# Patient Record
Sex: FEMALE | Race: WHITE | NOT HISPANIC OR LATINO | URBAN - METROPOLITAN AREA
[De-identification: names, ages, dates, MRNs, and addresses within clinical notes are randomized per-mention and may not be internally consistent; named-entity substitution may affect disease eponyms.]

---

## 2017-07-03 ENCOUNTER — OUTPATIENT (OUTPATIENT)
Dept: OUTPATIENT SERVICES | Facility: HOSPITAL | Age: 67
LOS: 1 days | End: 2017-07-03

## 2017-07-11 ENCOUNTER — INPATIENT (INPATIENT)
Facility: HOSPITAL | Age: 67
LOS: 1 days | Discharge: HOME | End: 2017-07-13
Payer: COMMERCIAL

## 2017-07-11 ENCOUNTER — OUTPATIENT (OUTPATIENT)
Dept: OUTPATIENT SERVICES | Facility: HOSPITAL | Age: 67
LOS: 1 days | End: 2017-07-11

## 2017-07-11 PROCEDURE — 73501 X-RAY EXAM HIP UNI 1 VIEW: CPT | Mod: 26,RT

## 2018-05-21 ENCOUNTER — APPOINTMENT (OUTPATIENT)
Dept: DERMATOLOGY | Facility: CLINIC | Age: 68
End: 2018-05-21
Payer: COMMERCIAL

## 2018-05-21 VITALS — BODY MASS INDEX: 26.2 KG/M2 | WEIGHT: 163 LBS | HEIGHT: 66 IN

## 2018-05-21 DIAGNOSIS — Z87.2 PERSONAL HISTORY OF DISEASES OF THE SKIN AND SUBCUTANEOUS TISSUE: ICD-10-CM

## 2018-05-21 DIAGNOSIS — Z87.09 PERSONAL HISTORY OF OTHER DISEASES OF THE RESPIRATORY SYSTEM: ICD-10-CM

## 2018-05-21 DIAGNOSIS — Z80.8 FAMILY HISTORY OF MALIGNANT NEOPLASM OF OTHER ORGANS OR SYSTEMS: ICD-10-CM

## 2018-05-21 DIAGNOSIS — Z87.891 PERSONAL HISTORY OF NICOTINE DEPENDENCE: ICD-10-CM

## 2018-05-21 DIAGNOSIS — Z80.9 FAMILY HISTORY OF MALIGNANT NEOPLASM, UNSPECIFIED: ICD-10-CM

## 2018-05-21 DIAGNOSIS — Z86.69 PERSONAL HISTORY OF OTHER DISEASES OF THE NERVOUS SYSTEM AND SENSE ORGANS: ICD-10-CM

## 2018-05-21 DIAGNOSIS — Z87.39 PERSONAL HISTORY OF OTHER DISEASES OF THE MUSCULOSKELETAL SYSTEM AND CONNECTIVE TISSUE: ICD-10-CM

## 2018-05-21 PROCEDURE — 99203 OFFICE O/P NEW LOW 30 MIN: CPT

## 2021-02-16 ENCOUNTER — OUTPATIENT (OUTPATIENT)
Dept: OUTPATIENT SERVICES | Facility: HOSPITAL | Age: 71
LOS: 1 days | End: 2021-02-16
Payer: COMMERCIAL

## 2021-02-16 PROCEDURE — 93010 ELECTROCARDIOGRAM REPORT: CPT

## 2021-03-04 DIAGNOSIS — Z01.818 ENCOUNTER FOR OTHER PREPROCEDURAL EXAMINATION: ICD-10-CM

## 2021-03-05 ENCOUNTER — APPOINTMENT (OUTPATIENT)
Dept: DISASTER EMERGENCY | Facility: CLINIC | Age: 71
End: 2021-03-05

## 2021-03-06 LAB — SARS-COV-2 N GENE NPH QL NAA+PROBE: NOT DETECTED

## 2021-03-08 ENCOUNTER — OUTPATIENT (OUTPATIENT)
Dept: OUTPATIENT SERVICES | Facility: HOSPITAL | Age: 71
LOS: 1 days | End: 2021-03-08

## 2021-03-08 ENCOUNTER — OUTPATIENT (OUTPATIENT)
Dept: INPATIENT UNIT | Facility: HOSPITAL | Age: 71
LOS: 1 days | End: 2021-03-08

## 2021-03-10 ENCOUNTER — OUTPATIENT (OUTPATIENT)
Dept: OUTPATIENT SERVICES | Facility: HOSPITAL | Age: 71
LOS: 1 days | End: 2021-03-10

## 2021-06-14 ENCOUNTER — APPOINTMENT (OUTPATIENT)
Dept: DERMATOLOGY | Facility: CLINIC | Age: 71
End: 2021-06-14

## 2021-07-20 ENCOUNTER — APPOINTMENT (OUTPATIENT)
Dept: DERMATOLOGY | Facility: CLINIC | Age: 71
End: 2021-07-20
Payer: COMMERCIAL

## 2021-07-20 DIAGNOSIS — L21.9 SEBORRHEIC DERMATITIS, UNSPECIFIED: ICD-10-CM

## 2021-07-20 DIAGNOSIS — Z12.83 ENCOUNTER FOR SCREENING FOR MALIGNANT NEOPLASM OF SKIN: ICD-10-CM

## 2021-07-20 PROCEDURE — 99072 ADDL SUPL MATRL&STAF TM PHE: CPT

## 2021-07-20 PROCEDURE — 99204 OFFICE O/P NEW MOD 45 MIN: CPT

## 2021-07-20 RX ORDER — TRIAMCINOLONE ACETONIDE 1 MG/G
0.1 OINTMENT TOPICAL
Qty: 1 | Refills: 0 | Status: ACTIVE | COMMUNITY
Start: 2021-07-20 | End: 1900-01-01

## 2021-07-20 RX ORDER — DOXYCYCLINE HYCLATE 100 MG/1
100 TABLET ORAL
Qty: 2 | Refills: 0 | Status: COMPLETED | COMMUNITY
Start: 2021-07-07 | End: 2021-07-20

## 2021-07-20 RX ORDER — KETOCONAZOLE 20 MG/G
2 CREAM TOPICAL
Qty: 1 | Refills: 2 | Status: ACTIVE | COMMUNITY
Start: 2021-07-20 | End: 1900-01-01

## 2021-08-26 ENCOUNTER — RX RENEWAL (OUTPATIENT)
Age: 71
End: 2021-08-26

## 2021-08-26 DIAGNOSIS — F41.9 ANXIETY DISORDER, UNSPECIFIED: ICD-10-CM

## 2021-08-26 DIAGNOSIS — F32.9 ANXIETY DISORDER, UNSPECIFIED: ICD-10-CM

## 2021-10-08 ENCOUNTER — APPOINTMENT (OUTPATIENT)
Dept: FAMILY MEDICINE | Facility: CLINIC | Age: 71
End: 2021-10-08

## 2022-05-18 ENCOUNTER — RX RENEWAL (OUTPATIENT)
Age: 72
End: 2022-05-18

## 2022-05-30 ENCOUNTER — FORM ENCOUNTER (OUTPATIENT)
Age: 72
End: 2022-05-30

## 2022-07-11 ENCOUNTER — APPOINTMENT (OUTPATIENT)
Dept: DERMATOLOGY | Facility: CLINIC | Age: 72
End: 2022-07-11

## 2022-07-11 DIAGNOSIS — D22.9 MELANOCYTIC NEVI, UNSPECIFIED: ICD-10-CM

## 2022-07-11 DIAGNOSIS — L82.1 OTHER SEBORRHEIC KERATOSIS: ICD-10-CM

## 2022-07-11 DIAGNOSIS — D18.01 HEMANGIOMA OF SKIN AND SUBCUTANEOUS TISSUE: ICD-10-CM

## 2022-07-11 PROCEDURE — 99213 OFFICE O/P EST LOW 20 MIN: CPT

## 2022-07-11 NOTE — PHYSICAL EXAM
[Full Body Skin Exam Performed] : performed [FreeTextEntry3] : Skin examination performed of the face, neck, trunk, arms, legs; \par The patient is well, alert and oriented, pleasant and cooperative.\par Eyelids, conjunctivae, oral mucosa, digits and nails all normal.  \par No cervical adenopathy.\par \par Normal findings include:\par \par Seborrheic keratoses- multiple\par Angiomas- one papular angioma mid chest\par Lentigines\par Multiple benign nevi were noted. \par \par No lesions were suspicious for malignancy. \par \par

## 2022-07-11 NOTE — ASSESSMENT
[FreeTextEntry1] : Complete skin examination is negative for malignancy; Multiple new concerns were addressed and discussed.\par Therapeutic options and their risks and benefits; along with multiple diagnostic possibilities were discussed at length;\par risks and benefits of skin biopsy and/or other further study were discussed;\par \par Continue regular exams; Follow up for TBSE in 1 year \par new angioma mid chest;  No treatment is required unless this lesion becomes symptomatic.

## 2022-07-11 NOTE — HISTORY OF PRESENT ILLNESS
[de-identified] : Pt. presents for skin check;\par c/o few spots of concern;  one new spot on chest\par Severity:  mild  \par Modifying factors:  none\par Associated symptoms:  none\par Context:  no association with activity\par

## 2022-08-10 ENCOUNTER — FORM ENCOUNTER (OUTPATIENT)
Age: 72
End: 2022-08-10

## 2022-10-31 ENCOUNTER — RX RENEWAL (OUTPATIENT)
Age: 72
End: 2022-10-31
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
## 2022-12-17 DIAGNOSIS — E55.9 VITAMIN D DEFICIENCY, UNSPECIFIED: ICD-10-CM

## 2022-12-17 DIAGNOSIS — J45.909 UNSPECIFIED ASTHMA, UNCOMPLICATED: ICD-10-CM

## 2022-12-17 RX ORDER — FLUOXETINE HYDROCHLORIDE 20 MG/1
20 CAPSULE ORAL
Refills: 0 | Status: DISCONTINUED | COMMUNITY
End: 2022-12-17

## 2022-12-18 ENCOUNTER — RESULT CHARGE (OUTPATIENT)
Age: 72
End: 2022-12-18

## 2022-12-19 ENCOUNTER — NON-APPOINTMENT (OUTPATIENT)
Age: 72
End: 2022-12-19

## 2022-12-19 ENCOUNTER — APPOINTMENT (OUTPATIENT)
Dept: FAMILY MEDICINE | Facility: CLINIC | Age: 72
End: 2022-12-19

## 2022-12-19 VITALS
TEMPERATURE: 97 F | HEART RATE: 83 BPM | BODY MASS INDEX: 28.12 KG/M2 | HEIGHT: 66 IN | OXYGEN SATURATION: 98 % | SYSTOLIC BLOOD PRESSURE: 158 MMHG | WEIGHT: 175 LBS | DIASTOLIC BLOOD PRESSURE: 82 MMHG

## 2022-12-19 VITALS — SYSTOLIC BLOOD PRESSURE: 128 MMHG | DIASTOLIC BLOOD PRESSURE: 78 MMHG

## 2022-12-19 DIAGNOSIS — Z23 ENCOUNTER FOR IMMUNIZATION: ICD-10-CM

## 2022-12-19 DIAGNOSIS — Z00.00 ENCOUNTER FOR GENERAL ADULT MEDICAL EXAMINATION W/OUT ABNORMAL FINDINGS: ICD-10-CM

## 2022-12-19 PROCEDURE — 99387 INIT PM E/M NEW PAT 65+ YRS: CPT | Mod: 25

## 2022-12-19 PROCEDURE — 90471 IMMUNIZATION ADMIN: CPT

## 2022-12-19 PROCEDURE — 93000 ELECTROCARDIOGRAM COMPLETE: CPT

## 2022-12-19 PROCEDURE — 36415 COLL VENOUS BLD VENIPUNCTURE: CPT

## 2022-12-19 PROCEDURE — 90750 HZV VACC RECOMBINANT IM: CPT

## 2022-12-19 NOTE — ASSESSMENT
[FreeTextEntry1] : PUNEET GONZALEZ is a 72 year old female here for a physical exam.\par \par Patient has a history of asthma, allergies, anxiety, depression, and vitamin D deficiency.\par \par Her EKG is within normal limits. \par

## 2022-12-19 NOTE — HISTORY OF PRESENT ILLNESS
[FreeTextEntry1] : PUNEET GONZALEZ is a 72 year old female here for a physical exam.  [de-identified] : Her last physical exam was 12/2020\par \par Vaccines:\par Tetanus is up to date; last 12/22/2015\par Pneumococcal vaccination is up to date\par Shingrix is NOT up to date; she had Zostavax in 2015\par COVID vaccine is up to date \par \par Her last dentist visit was less than one year ago\par Her last eye doctor appointment was less than one year ago\par Her last dermatologist visit was less than one year ago\par \par GYN visit is up to date, Dr. Kaley Otero ([facility])\par Mammogram is up to date\par Colon cancer screening is up to date; colonoscopy 8/11/2022, 5 yr f/u recommended (Dr. Lazar)\par DEXA is up to date \par \par Her diet is healthy overall\par Exercise: cardio, walking, swimming

## 2022-12-19 NOTE — HEALTH RISK ASSESSMENT
[No falls in past year] : Patient reported no falls in the past year [0] : 2) Feeling down, depressed, or hopeless: Not at all (0) [PHQ-2 Negative - No further assessment needed] : PHQ-2 Negative - No further assessment needed [[id number]] : 0

## 2022-12-19 NOTE — PLAN
[FreeTextEntry1] : Continue all medications as prescribed. Check labs as above. Will adjust any medications based upon lab results.\par \par Reviewed age-appropriate preventive screening tests with patient.\par \par Discussed clean eating (e.g. Mediterranean style diet) and recommendations for regular exercise/staying as physically active as possible.\par \par Reviewed importance of good self care (e.g. meditation, yoga, adequate rest, regular exercise, magnesium, clean eating, etc.).\par \par Follow up for next physical in one year.

## 2022-12-20 LAB
25(OH)D3 SERPL-MCNC: 18.9 NG/ML
ALBUMIN SERPL ELPH-MCNC: 4.4 G/DL
ALP BLD-CCNC: 93 U/L
ALT SERPL-CCNC: 12 U/L
ANION GAP SERPL CALC-SCNC: 9 MMOL/L
AST SERPL-CCNC: 18 U/L
BASOPHILS # BLD AUTO: 0.04 K/UL
BASOPHILS NFR BLD AUTO: 0.7 %
BILIRUB SERPL-MCNC: 0.3 MG/DL
BUN SERPL-MCNC: 15 MG/DL
CALCIUM SERPL-MCNC: 9.4 MG/DL
CHLORIDE SERPL-SCNC: 104 MMOL/L
CHOLEST SERPL-MCNC: 228 MG/DL
CO2 SERPL-SCNC: 28 MMOL/L
CREAT SERPL-MCNC: 0.63 MG/DL
EGFR: 95 ML/MIN/1.73M2
EOSINOPHIL # BLD AUTO: 0.09 K/UL
EOSINOPHIL NFR BLD AUTO: 1.6 %
GLUCOSE SERPL-MCNC: 92 MG/DL
HCT VFR BLD CALC: 42.8 %
HDLC SERPL-MCNC: 71 MG/DL
HGB BLD-MCNC: 13.6 G/DL
IMM GRANULOCYTES NFR BLD AUTO: 0.4 %
LDLC SERPL CALC-MCNC: 143 MG/DL
LYMPHOCYTES # BLD AUTO: 2.13 K/UL
LYMPHOCYTES NFR BLD AUTO: 38.5 %
MAN DIFF?: NORMAL
MCHC RBC-ENTMCNC: 29.4 PG
MCHC RBC-ENTMCNC: 31.8 GM/DL
MCV RBC AUTO: 92.6 FL
MONOCYTES # BLD AUTO: 0.44 K/UL
MONOCYTES NFR BLD AUTO: 8 %
NEUTROPHILS # BLD AUTO: 2.81 K/UL
NEUTROPHILS NFR BLD AUTO: 50.8 %
NONHDLC SERPL-MCNC: 157 MG/DL
PLATELET # BLD AUTO: 134 K/UL
POTASSIUM SERPL-SCNC: 4.2 MMOL/L
PROT SERPL-MCNC: 6.7 G/DL
RBC # BLD: 4.62 M/UL
RBC # FLD: 13.2 %
SODIUM SERPL-SCNC: 142 MMOL/L
TRIGL SERPL-MCNC: 70 MG/DL
TSH SERPL-ACNC: 1.33 UIU/ML
WBC # FLD AUTO: 5.53 K/UL

## 2022-12-21 ENCOUNTER — NON-APPOINTMENT (OUTPATIENT)
Age: 72
End: 2022-12-21

## 2023-01-06 RX ORDER — ALBUTEROL SULFATE 90 UG/1
108 (90 BASE) INHALANT RESPIRATORY (INHALATION)
Qty: 1 | Refills: 3 | Status: ACTIVE | COMMUNITY
Start: 2023-01-06 | End: 1900-01-01

## 2023-03-06 ENCOUNTER — APPOINTMENT (OUTPATIENT)
Dept: FAMILY MEDICINE | Facility: CLINIC | Age: 73
End: 2023-03-06

## 2023-05-01 ENCOUNTER — RX RENEWAL (OUTPATIENT)
Age: 73
End: 2023-05-01

## 2023-09-11 ENCOUNTER — RX RENEWAL (OUTPATIENT)
Age: 73
End: 2023-09-11

## 2023-09-11 RX ORDER — MONTELUKAST 10 MG/1
10 TABLET, FILM COATED ORAL
Qty: 90 | Refills: 3 | Status: ACTIVE | COMMUNITY
Start: 1900-01-01 | End: 1900-01-01

## 2023-09-15 ASSESSMENT — KOOS JR
IMPORTED KOOS JR SCORE: 50.01
BENDING TO THE FLOOR TO PICK UP OBJECT: MODERATE
BENDING TO THE FLOOR TO PICK UP OBJECT: MODERATE
TWISING OR PIVOTING ON KNEE: MILD
RISING FROM SITTING: MODERATE
IMPORTED KOOS JR SCORE: 50.01
IMPORTED KOOS JR SCORE: 50.01
HOW SEVERE IS YOUR KNEE STIFFNESS AFTER FIRST WAKING IN MORNING: MODERATE
TWISING OR PIVOTING ON KNEE: SEVERE
HOW SEVERE IS YOUR KNEE STIFFNESS AFTER FIRST WAKING IN MORNING: MODERATE
RISING FROM SITTING: MILD
IMPORTED KOOS JR SCORE: 84.6
IMPORTED FORM: YES
GOING UP OR DOWN STAIRS: MODERATE
GOING UP OR DOWN STAIRS: MODERATE
IMPORTED KOOS JR SCORE: 84.6
TWISING OR PIVOTING ON KNEE: MILD
IMPORTED FORM: YES
IMPORTED LATERALITY: RIGHT
IMPORTED FORM: YES
BENDING TO THE FLOOR TO PICK UP OBJECT: MODERATE
KOOS JR RAW SCORE: 15
TWISING OR PIVOTING ON KNEE: SEVERE
STANDING UPRIGHT: MODERATE
TWISING OR PIVOTING ON KNEE: MILD
KOOS JR RAW SCORE: 4
KOOS JR RAW SCORE: 5
STRAIGHTENING KNEE FULLY: MODERATE
BENDING TO THE FLOOR TO PICK UP OBJECT: MILD
TWISING OR PIVOTING ON KNEE: MILD
HOW SEVERE IS YOUR KNEE STIFFNESS AFTER FIRST WAKING IN MORNING: MILD
KOOS JR RAW SCORE: 15
STANDING UPRIGHT: MODERATE
BENDING TO THE FLOOR TO PICK UP OBJECT: MILD
RISING FROM SITTING: MILD
GOING UP OR DOWN STAIRS: MILD
IMPORTED KOOS JR SCORE: 76.33
KOOS JR RAW SCORE: 2
GOING UP OR DOWN STAIRS: MILD
IMPORTED FORM: YES
IMPORTED LATERALITY: RIGHT
IMPORTED KOOS JR SCORE: 84.6
IMPORTED KOOS JR SCORE: 76.33
KOOS JR RAW SCORE: 4
TWISING OR PIVOTING ON KNEE: MILD
IMPORTED LATERALITY: RIGHT
HOW SEVERE IS YOUR KNEE STIFFNESS AFTER FIRST WAKING IN MORNING: MILD
RISING FROM SITTING: MODERATE
KOOS JR RAW SCORE: 5
RISING FROM SITTING: MILD
GOING UP OR DOWN STAIRS: MILD
TWISING OR PIVOTING ON KNEE: SEVERE
GOING UP OR DOWN STAIRS: MILD
HOW SEVERE IS YOUR KNEE STIFFNESS AFTER FIRST WAKING IN MORNING: MODERATE
BENDING TO THE FLOOR TO PICK UP OBJECT: MILD
RISING FROM SITTING: MILD
STANDING UPRIGHT: MODERATE
RISING FROM SITTING: MILD
KOOS JR RAW SCORE: 4
IMPORTED LATERALITY: RIGHT
IMPORTED FORM: YES
GOING UP OR DOWN STAIRS: MILD
KOOS JR RAW SCORE: 2
HOW SEVERE IS YOUR KNEE STIFFNESS AFTER FIRST WAKING IN MORNING: MILD
GOING UP OR DOWN STAIRS: MILD
RISING FROM SITTING: MILD
TWISING OR PIVOTING ON KNEE: MILD
STRAIGHTENING KNEE FULLY: MODERATE
RISING FROM SITTING: MODERATE
IMPORTED KOOS JR SCORE: 76.33
IMPORTED KOOS JR SCORE: 73.34
IMPORTED LATERALITY: RIGHT
KOOS JR RAW SCORE: 5
IMPORTED FORM: YES
HOW SEVERE IS YOUR KNEE STIFFNESS AFTER FIRST WAKING IN MORNING: MILD
IMPORTED KOOS JR SCORE: 73.34
KOOS JR RAW SCORE: 15
HOW SEVERE IS YOUR KNEE STIFFNESS AFTER FIRST WAKING IN MORNING: MILD
IMPORTED KOOS JR SCORE: 73.34
HOW SEVERE IS YOUR KNEE STIFFNESS AFTER FIRST WAKING IN MORNING: MILD
IMPORTED LATERALITY: RIGHT
STRAIGHTENING KNEE FULLY: MODERATE
KOOS JR RAW SCORE: 2
GOING UP OR DOWN STAIRS: MODERATE

## 2023-11-07 ENCOUNTER — APPOINTMENT (OUTPATIENT)
Dept: FAMILY MEDICINE | Facility: CLINIC | Age: 73
End: 2023-11-07
Payer: COMMERCIAL

## 2023-11-07 VITALS
TEMPERATURE: 97.1 F | DIASTOLIC BLOOD PRESSURE: 82 MMHG | BODY MASS INDEX: 28.45 KG/M2 | SYSTOLIC BLOOD PRESSURE: 104 MMHG | HEART RATE: 96 BPM | WEIGHT: 177 LBS | HEIGHT: 66 IN | OXYGEN SATURATION: 95 %

## 2023-11-07 DIAGNOSIS — R59.1 GENERALIZED ENLARGED LYMPH NODES: ICD-10-CM

## 2023-11-07 PROCEDURE — 90471 IMMUNIZATION ADMIN: CPT

## 2023-11-07 PROCEDURE — 90750 HZV VACC RECOMBINANT IM: CPT

## 2023-11-07 PROCEDURE — 99213 OFFICE O/P EST LOW 20 MIN: CPT | Mod: 25

## 2023-11-21 ENCOUNTER — OUTPATIENT (OUTPATIENT)
Dept: OUTPATIENT SERVICES | Facility: HOSPITAL | Age: 73
LOS: 1 days | End: 2023-11-21
Payer: COMMERCIAL

## 2023-11-21 ENCOUNTER — APPOINTMENT (OUTPATIENT)
Dept: ULTRASOUND IMAGING | Facility: CLINIC | Age: 73
End: 2023-11-21
Payer: COMMERCIAL

## 2023-11-21 DIAGNOSIS — R59.1 GENERALIZED ENLARGED LYMPH NODES: ICD-10-CM

## 2023-11-21 PROCEDURE — 76536 US EXAM OF HEAD AND NECK: CPT

## 2023-11-21 PROCEDURE — 76536 US EXAM OF HEAD AND NECK: CPT | Mod: 26

## 2023-11-30 DIAGNOSIS — J30.2 OTHER SEASONAL ALLERGIC RHINITIS: ICD-10-CM

## 2023-12-18 ENCOUNTER — NON-APPOINTMENT (OUTPATIENT)
Age: 73
End: 2023-12-18

## 2023-12-18 ENCOUNTER — APPOINTMENT (OUTPATIENT)
Dept: ALLERGY | Facility: CLINIC | Age: 73
End: 2023-12-18
Payer: COMMERCIAL

## 2023-12-18 VITALS
DIASTOLIC BLOOD PRESSURE: 72 MMHG | HEART RATE: 83 BPM | TEMPERATURE: 98.8 F | RESPIRATION RATE: 13 BRPM | SYSTOLIC BLOOD PRESSURE: 125 MMHG | OXYGEN SATURATION: 96 %

## 2023-12-18 PROCEDURE — 95004 PERQ TESTS W/ALRGNC XTRCS: CPT

## 2023-12-18 PROCEDURE — 94060 EVALUATION OF WHEEZING: CPT

## 2023-12-18 PROCEDURE — 99204 OFFICE O/P NEW MOD 45 MIN: CPT | Mod: 25

## 2023-12-18 RX ORDER — LEVALBUTEROL TARTRATE 45 UG/1
45 AEROSOL, METERED ORAL EVERY 4 HOURS
Qty: 1 | Refills: 1 | Status: ACTIVE | COMMUNITY
Start: 2023-12-18 | End: 1900-01-01

## 2023-12-18 NOTE — SOCIAL HISTORY
[House] : [unfilled] lives in a house  [Central Forced Air] : heating provided by central forced air [Central] : air conditioning provided by central unit [Bedroom] :  in bedroom [Dog] : dog [] :  [FreeTextEntry1] : Lives with spouse Navdeep gupta.   [Basement] : not in the basement [Living Area] : not in the living area [de-identified] : area lalos

## 2023-12-18 NOTE — IMPRESSION
[_____] : dust mites ([unfilled]) [Allergy Testing Cockroach] : cockroach [Allergy Testing Cat] : cat [Allergy Testing Dog] : dog [] : molds [Allergy Testing Trees] : trees [Allergy Testing Weeds] : weeds [Allergy Testing Grasses] : grasses

## 2023-12-18 NOTE — PHYSICAL EXAM
[Alert] : alert [Well Nourished] : well nourished [Healthy Appearance] : healthy appearance [No Acute Distress] : no acute distress [Well Developed] : well developed [Normal Voice/Communication] : normal voice communication [Normal Nasal Mucosa] : the nasal mucosa was normal [No Crackles] : no crackles [No Retractions] : no retractions [Normal Rate] : heart rate was normal  [Normal S1, S2] : normal S1 and S2 [Regular Rhythm] : with a regular rhythm [Skin Intact] : skin intact  [Normal Mood] : mood was normal [Normal Affect] : affect was normal [Wheezing] : no wheezing was heard [de-identified] : one small movable LN on right posterior cervical  [de-identified] : see above

## 2023-12-18 NOTE — ASSESSMENT
[FreeTextEntry1] : Mild persistent asthma:  Continue Singulair QD Add Arnuity 200 QD Change to Xopenex 2 puffs QID prn   Perennial allergic rhinitis:  RV environmental intradermal skin testing   Right posterior cervical LN - mild enlarged LN - recommend follow up with PCP in 1 month

## 2023-12-18 NOTE — HISTORY OF PRESENT ILLNESS
[de-identified] : Patient with a long history of seasonal allergies which became perennial - she was followed by Dr. Fernando and she stopped treatment over 15 years ago.   Her PCP was treating her symptoms with Singulair - albuterol prn - Flonase - for control of asthma and SHO.   She noted an enlarged LN right side of neck - US performed - no biopsy was done.    Patient had COVID 5/23 - she was sick for about 5 days - she took Paxlovid.    She was supposed to take Claritin D and she uses prn azelastine.    Patient has had asthma since her 50s - symptoms have worsened as she gets older - she does not use her rescue inhaler - she gets very "jumpy" with albuterol.

## 2024-01-15 ENCOUNTER — APPOINTMENT (OUTPATIENT)
Dept: ALLERGY | Facility: CLINIC | Age: 74
End: 2024-01-15
Payer: COMMERCIAL

## 2024-01-15 PROCEDURE — 95024 IQ TESTS W/ALLERGENIC XTRCS: CPT

## 2024-01-15 PROCEDURE — 99214 OFFICE O/P EST MOD 30 MIN: CPT | Mod: 25

## 2024-01-15 NOTE — SOCIAL HISTORY
[House] : [unfilled] lives in a house  [Central Forced Air] : heating provided by central forced air [Central] : air conditioning provided by central unit [Bedroom] :  in bedroom [Dog] : dog [] :  [FreeTextEntry1] : Lives with spouse Navdeep gupta.   [Basement] : not in the basement [Living Area] : not in the living area [de-identified] : area lalos

## 2024-01-15 NOTE — ASSESSMENT
[FreeTextEntry1] : Mild persistent asthma:  Continue Singulair QD Add Arnuity 200 QD Change to Xopenex 2 puffs QID prn   Perennial allergic rhinitis:  Avoidance measures reviewed with patient.   RV 6 months or prn

## 2024-01-15 NOTE — HISTORY OF PRESENT ILLNESS
[de-identified] : Patient is better on Arnuity 200 QD and she tolerates Xopenex - asthma better tolerated and tolerates Xopenex better

## 2024-01-15 NOTE — PHYSICAL EXAM
[Alert] : alert [Well Nourished] : well nourished [Healthy Appearance] : healthy appearance [No Acute Distress] : no acute distress [Well Developed] : well developed [Normal Voice/Communication] : normal voice communication [Normal Nasal Mucosa] : the nasal mucosa was normal [No Crackles] : no crackles [No Retractions] : no retractions [Normal Rate] : heart rate was normal  [Normal S1, S2] : normal S1 and S2 [Regular Rhythm] : with a regular rhythm [Skin Intact] : skin intact  [Normal Mood] : mood was normal [Normal Affect] : affect was normal [Wheezing] : no wheezing was heard [de-identified] : one small movable LN on right posterior cervical  [de-identified] : see above

## 2024-02-12 ENCOUNTER — RX RENEWAL (OUTPATIENT)
Age: 74
End: 2024-02-12

## 2024-02-12 RX ORDER — FLUOXETINE HYDROCHLORIDE 20 MG/1
20 CAPSULE ORAL
Qty: 90 | Refills: 2 | Status: ACTIVE | COMMUNITY
Start: 2021-08-26 | End: 1900-01-01

## 2024-05-15 ENCOUNTER — APPOINTMENT (OUTPATIENT)
Dept: ALLERGY | Facility: CLINIC | Age: 74
End: 2024-05-15
Payer: COMMERCIAL

## 2024-05-15 VITALS
OXYGEN SATURATION: 95 % | SYSTOLIC BLOOD PRESSURE: 132 MMHG | HEART RATE: 89 BPM | DIASTOLIC BLOOD PRESSURE: 72 MMHG | TEMPERATURE: 98.4 F

## 2024-05-15 PROCEDURE — 99214 OFFICE O/P EST MOD 30 MIN: CPT | Mod: 25

## 2024-05-15 PROCEDURE — 95012 NITRIC OXIDE EXP GAS DETER: CPT

## 2024-05-15 NOTE — HISTORY OF PRESENT ILLNESS
[de-identified] : Patient is taking Arnuity QD - Singulair QD and Xopenex - she reports use of rescue inhaler rarely - she had URI about 1 month ago - needed Xopenex on a regular basis -

## 2024-05-15 NOTE — PHYSICAL EXAM
[Alert] : alert [Well Nourished] : well nourished [Healthy Appearance] : healthy appearance [No Acute Distress] : no acute distress [Well Developed] : well developed [Normal Voice/Communication] : normal voice communication [Normal Nasal Mucosa] : the nasal mucosa was normal [No Crackles] : no crackles [No Retractions] : no retractions [Wheezing] : no wheezing was heard [Normal Rate] : heart rate was normal  [Normal S1, S2] : normal S1 and S2 [Regular Rhythm] : with a regular rhythm [Skin Intact] : skin intact  [Normal Mood] : mood was normal [Normal Affect] : affect was normal [de-identified] : one small movable LN on right posterior cervical  [de-identified] : see above

## 2024-05-15 NOTE — SOCIAL HISTORY
[FreeTextEntry1] : Lives with spouse Navdeep gupta.   [House] : [unfilled] lives in a house  [Central Forced Air] : heating provided by central forced air [Central] : air conditioning provided by central unit [Bedroom] :  in bedroom [Basement] : not in the basement [Living Area] : not in the living area [Dog] : dog [] :  [de-identified] : area lalos

## 2024-05-15 NOTE — ASSESSMENT
[FreeTextEntry1] : Mild persistent asthma:  Singulair QD Arnuity 200 QD Change to Xopenex 2 puffs QID prn   RV 6 months or prn

## 2024-05-27 ENCOUNTER — RX RENEWAL (OUTPATIENT)
Age: 74
End: 2024-05-27

## 2024-05-27 RX ORDER — FLUTICASONE FUROATE 200 UG/1
200 POWDER RESPIRATORY (INHALATION) DAILY
Qty: 30 | Refills: 2 | Status: ACTIVE | COMMUNITY
Start: 2023-12-18 | End: 1900-01-01

## 2024-10-08 ENCOUNTER — RX RENEWAL (OUTPATIENT)
Age: 74
End: 2024-10-08

## 2024-11-13 ENCOUNTER — APPOINTMENT (OUTPATIENT)
Dept: ALLERGY | Facility: CLINIC | Age: 74
End: 2024-11-13
Payer: COMMERCIAL

## 2024-11-13 VITALS
HEIGHT: 66 IN | DIASTOLIC BLOOD PRESSURE: 80 MMHG | OXYGEN SATURATION: 98 % | HEART RATE: 65 BPM | TEMPERATURE: 98.7 F | BODY MASS INDEX: 28.45 KG/M2 | SYSTOLIC BLOOD PRESSURE: 146 MMHG | WEIGHT: 177 LBS

## 2024-11-13 PROCEDURE — 99214 OFFICE O/P EST MOD 30 MIN: CPT | Mod: 25

## 2024-11-13 PROCEDURE — 94060 EVALUATION OF WHEEZING: CPT

## 2024-11-13 PROCEDURE — 95012 NITRIC OXIDE EXP GAS DETER: CPT

## 2024-12-18 ENCOUNTER — RX RENEWAL (OUTPATIENT)
Age: 74
End: 2024-12-18

## 2024-12-18 ENCOUNTER — APPOINTMENT (OUTPATIENT)
Dept: BREAST CENTER | Facility: CLINIC | Age: 74
End: 2024-12-18
Payer: COMMERCIAL

## 2024-12-18 VITALS — HEIGHT: 66.5 IN | BODY MASS INDEX: 28.75 KG/M2 | RESPIRATION RATE: 16 BRPM | WEIGHT: 181 LBS

## 2024-12-18 DIAGNOSIS — R59.0 LOCALIZED ENLARGED LYMPH NODES: ICD-10-CM

## 2024-12-18 PROCEDURE — 99205 OFFICE O/P NEW HI 60 MIN: CPT | Mod: 25

## 2024-12-18 PROCEDURE — 38505 NEEDLE BIOPSY LYMPH NODES: CPT | Mod: RT

## 2024-12-19 PROBLEM — R59.0 AXILLARY LYMPHADENOPATHY: Status: ACTIVE | Noted: 2024-12-13

## 2024-12-24 LAB — CORE LAB BIOPSY: NORMAL

## 2024-12-26 ENCOUNTER — NON-APPOINTMENT (OUTPATIENT)
Age: 74
End: 2024-12-26

## 2025-01-07 ENCOUNTER — APPOINTMENT (OUTPATIENT)
Dept: NUCLEAR MEDICINE | Facility: CLINIC | Age: 75
End: 2025-01-07
Payer: COMMERCIAL

## 2025-01-07 ENCOUNTER — OUTPATIENT (OUTPATIENT)
Dept: OUTPATIENT SERVICES | Facility: HOSPITAL | Age: 75
LOS: 1 days | End: 2025-01-07
Payer: COMMERCIAL

## 2025-01-07 DIAGNOSIS — R59.0 LOCALIZED ENLARGED LYMPH NODES: ICD-10-CM

## 2025-01-07 PROCEDURE — 78815 PET IMAGE W/CT SKULL-THIGH: CPT | Mod: 26,PI

## 2025-01-07 PROCEDURE — A9552: CPT

## 2025-01-07 PROCEDURE — 78815 PET IMAGE W/CT SKULL-THIGH: CPT

## 2025-01-15 ENCOUNTER — OUTPATIENT (OUTPATIENT)
Dept: OUTPATIENT SERVICES | Facility: HOSPITAL | Age: 75
LOS: 1 days | Discharge: HOME | End: 2025-01-15
Payer: COMMERCIAL

## 2025-01-15 DIAGNOSIS — C85.90 NON-HODGKIN LYMPHOMA, UNSPECIFIED, UNSPECIFIED SITE: ICD-10-CM

## 2025-01-15 DIAGNOSIS — C82.90 FOLLICULAR LYMPHOMA, UNSPECIFIED, UNSPECIFIED SITE: ICD-10-CM

## 2025-01-16 ENCOUNTER — LABORATORY RESULT (OUTPATIENT)
Age: 75
End: 2025-01-16

## 2025-01-16 ENCOUNTER — RESULT REVIEW (OUTPATIENT)
Age: 75
End: 2025-01-16

## 2025-01-16 ENCOUNTER — APPOINTMENT (OUTPATIENT)
Dept: HEMATOLOGY ONCOLOGY | Facility: CLINIC | Age: 75
End: 2025-01-16

## 2025-01-16 VITALS
HEIGHT: 66.5 IN | WEIGHT: 179 LBS | SYSTOLIC BLOOD PRESSURE: 143 MMHG | OXYGEN SATURATION: 98 % | BODY MASS INDEX: 28.43 KG/M2 | TEMPERATURE: 98.7 F | DIASTOLIC BLOOD PRESSURE: 83 MMHG | HEART RATE: 82 BPM

## 2025-01-16 DIAGNOSIS — R53.83 OTHER FATIGUE: ICD-10-CM

## 2025-01-16 LAB
BASOPHILS # BLD AUTO: 0.04 K/UL — SIGNIFICANT CHANGE UP (ref 0–0.2)
BASOPHILS NFR BLD AUTO: 0.6 % — SIGNIFICANT CHANGE UP (ref 0–2)
EOSINOPHIL # BLD AUTO: 0.07 K/UL — SIGNIFICANT CHANGE UP (ref 0–0.5)
EOSINOPHIL NFR BLD AUTO: 1.1 % — SIGNIFICANT CHANGE UP (ref 0–6)
HCT VFR BLD CALC: 42.9 % — SIGNIFICANT CHANGE UP (ref 34.5–45)
HGB BLD-MCNC: 14.6 G/DL — SIGNIFICANT CHANGE UP (ref 11.5–15.5)
IMM GRANULOCYTES NFR BLD AUTO: 0.2 % — SIGNIFICANT CHANGE UP (ref 0–0.9)
LYMPHOCYTES # BLD AUTO: 1.57 K/UL — SIGNIFICANT CHANGE UP (ref 1–3.3)
LYMPHOCYTES # BLD AUTO: 24.2 % — SIGNIFICANT CHANGE UP (ref 13–44)
MCHC RBC-ENTMCNC: 30.7 PG — SIGNIFICANT CHANGE UP (ref 27–34)
MCHC RBC-ENTMCNC: 34 G/DL — SIGNIFICANT CHANGE UP (ref 32–36)
MCV RBC AUTO: 90.1 FL — SIGNIFICANT CHANGE UP (ref 80–100)
MONOCYTES # BLD AUTO: 0.48 K/UL — SIGNIFICANT CHANGE UP (ref 0–0.9)
MONOCYTES NFR BLD AUTO: 7.4 % — SIGNIFICANT CHANGE UP (ref 2–14)
NEUTROPHILS # BLD AUTO: 4.33 K/UL — SIGNIFICANT CHANGE UP (ref 1.8–7.4)
NEUTROPHILS NFR BLD AUTO: 66.5 % — SIGNIFICANT CHANGE UP (ref 43–77)
NRBC # BLD: 0 /100 WBCS — SIGNIFICANT CHANGE UP (ref 0–0)
NRBC BLD-RTO: 0 /100 WBCS — SIGNIFICANT CHANGE UP (ref 0–0)
PLATELET # BLD AUTO: 228 K/UL — SIGNIFICANT CHANGE UP (ref 150–400)
RBC # BLD: 4.76 M/UL — SIGNIFICANT CHANGE UP (ref 3.8–5.2)
RBC # FLD: 12.6 % — SIGNIFICANT CHANGE UP (ref 10.3–14.5)
WBC # BLD: 6.5 K/UL — SIGNIFICANT CHANGE UP (ref 3.8–10.5)
WBC # FLD AUTO: 6.5 K/UL — SIGNIFICANT CHANGE UP (ref 3.8–10.5)

## 2025-01-16 PROCEDURE — 93010 ELECTROCARDIOGRAM REPORT: CPT

## 2025-01-16 PROCEDURE — 99205 OFFICE O/P NEW HI 60 MIN: CPT

## 2025-01-20 LAB
ALBUMIN SERPL ELPH-MCNC: 4.6 G/DL
ALP BLD-CCNC: 86 U/L
ALT SERPL-CCNC: 9 U/L
ANION GAP SERPL CALC-SCNC: 11 MMOL/L
AST SERPL-CCNC: 17 U/L
BILIRUB SERPL-MCNC: 0.4 MG/DL
BUN SERPL-MCNC: 17 MG/DL
CALCIUM SERPL-MCNC: 9.6 MG/DL
CHLORIDE SERPL-SCNC: 104 MMOL/L
CO2 SERPL-SCNC: 28 MMOL/L
CREAT SERPL-MCNC: 0.75 MG/DL
EGFR: 85 ML/MIN/1.73M2
GLUCOSE SERPL-MCNC: 112 MG/DL
HBV CORE IGG+IGM SER QL: NONREACTIVE
HBV SURFACE AB SER QL: NONREACTIVE
HBV SURFACE AG SER QL: NONREACTIVE
HIV1+2 AB SPEC QL IA.RAPID: NONREACTIVE
LDH SERPL-CCNC: 204 U/L
POTASSIUM SERPL-SCNC: 4.1 MMOL/L
PROT SERPL-MCNC: 6.7 G/DL
SODIUM SERPL-SCNC: 143 MMOL/L
URATE SERPL-MCNC: 3.6 MG/DL

## 2025-01-28 ENCOUNTER — NON-APPOINTMENT (OUTPATIENT)
Age: 75
End: 2025-01-28

## 2025-01-30 ENCOUNTER — RESULT REVIEW (OUTPATIENT)
Age: 75
End: 2025-01-30

## 2025-01-30 ENCOUNTER — APPOINTMENT (OUTPATIENT)
Dept: INFUSION THERAPY | Facility: CLINIC | Age: 75
End: 2025-01-30

## 2025-01-30 LAB
BASOPHILS # BLD AUTO: 0.03 K/UL — SIGNIFICANT CHANGE UP (ref 0–0.2)
BASOPHILS NFR BLD AUTO: 0.6 % — SIGNIFICANT CHANGE UP (ref 0–2)
EOSINOPHIL # BLD AUTO: 0.07 K/UL — SIGNIFICANT CHANGE UP (ref 0–0.5)
EOSINOPHIL NFR BLD AUTO: 1.3 % — SIGNIFICANT CHANGE UP (ref 0–6)
HCT VFR BLD CALC: 43.1 % — SIGNIFICANT CHANGE UP (ref 34.5–45)
HGB BLD-MCNC: 14.5 G/DL — SIGNIFICANT CHANGE UP (ref 11.5–15.5)
IMM GRANULOCYTES NFR BLD AUTO: 0.2 % — SIGNIFICANT CHANGE UP (ref 0–0.9)
LYMPHOCYTES # BLD AUTO: 1.45 K/UL — SIGNIFICANT CHANGE UP (ref 1–3.3)
LYMPHOCYTES # BLD AUTO: 27.3 % — SIGNIFICANT CHANGE UP (ref 13–44)
MCHC RBC-ENTMCNC: 30.2 PG — SIGNIFICANT CHANGE UP (ref 27–34)
MCHC RBC-ENTMCNC: 33.6 G/DL — SIGNIFICANT CHANGE UP (ref 32–36)
MCV RBC AUTO: 89.8 FL — SIGNIFICANT CHANGE UP (ref 80–100)
MONOCYTES # BLD AUTO: 0.42 K/UL — SIGNIFICANT CHANGE UP (ref 0–0.9)
MONOCYTES NFR BLD AUTO: 7.9 % — SIGNIFICANT CHANGE UP (ref 2–14)
NEUTROPHILS # BLD AUTO: 3.34 K/UL — SIGNIFICANT CHANGE UP (ref 1.8–7.4)
NEUTROPHILS NFR BLD AUTO: 62.7 % — SIGNIFICANT CHANGE UP (ref 43–77)
NRBC # BLD: 0 /100 WBCS — SIGNIFICANT CHANGE UP (ref 0–0)
NRBC BLD-RTO: 0 /100 WBCS — SIGNIFICANT CHANGE UP (ref 0–0)
PLATELET # BLD AUTO: 138 K/UL — LOW (ref 150–400)
RBC # BLD: 4.8 M/UL — SIGNIFICANT CHANGE UP (ref 3.8–5.2)
RBC # FLD: 12.7 % — SIGNIFICANT CHANGE UP (ref 10.3–14.5)
WBC # BLD: 5.32 K/UL — SIGNIFICANT CHANGE UP (ref 3.8–10.5)
WBC # FLD AUTO: 5.32 K/UL — SIGNIFICANT CHANGE UP (ref 3.8–10.5)

## 2025-01-31 DIAGNOSIS — Z51.11 ENCOUNTER FOR ANTINEOPLASTIC CHEMOTHERAPY: ICD-10-CM

## 2025-01-31 DIAGNOSIS — R11.2 NAUSEA WITH VOMITING, UNSPECIFIED: ICD-10-CM

## 2025-02-06 ENCOUNTER — APPOINTMENT (OUTPATIENT)
Dept: INFUSION THERAPY | Facility: CLINIC | Age: 75
End: 2025-02-06

## 2025-02-06 ENCOUNTER — RESULT REVIEW (OUTPATIENT)
Age: 75
End: 2025-02-06

## 2025-02-06 VITALS
BODY MASS INDEX: 28.59 KG/M2 | WEIGHT: 180 LBS | HEART RATE: 82 BPM | DIASTOLIC BLOOD PRESSURE: 70 MMHG | HEIGHT: 66.5 IN | OXYGEN SATURATION: 96 % | TEMPERATURE: 98 F | SYSTOLIC BLOOD PRESSURE: 117 MMHG

## 2025-02-06 LAB
BASOPHILS # BLD AUTO: 0.03 K/UL — SIGNIFICANT CHANGE UP (ref 0–0.2)
BASOPHILS NFR BLD AUTO: 0.6 % — SIGNIFICANT CHANGE UP (ref 0–2)
EOSINOPHIL # BLD AUTO: 0.07 K/UL — SIGNIFICANT CHANGE UP (ref 0–0.5)
EOSINOPHIL NFR BLD AUTO: 1.4 % — SIGNIFICANT CHANGE UP (ref 0–6)
HCT VFR BLD CALC: 40.9 % — SIGNIFICANT CHANGE UP (ref 34.5–45)
HGB BLD-MCNC: 14.1 G/DL — SIGNIFICANT CHANGE UP (ref 11.5–15.5)
IMM GRANULOCYTES NFR BLD AUTO: 0.2 % — SIGNIFICANT CHANGE UP (ref 0–0.9)
LYMPHOCYTES # BLD AUTO: 0.98 K/UL — LOW (ref 1–3.3)
LYMPHOCYTES # BLD AUTO: 19.8 % — SIGNIFICANT CHANGE UP (ref 13–44)
MCHC RBC-ENTMCNC: 30.5 PG — SIGNIFICANT CHANGE UP (ref 27–34)
MCHC RBC-ENTMCNC: 34.5 G/DL — SIGNIFICANT CHANGE UP (ref 32–36)
MCV RBC AUTO: 88.5 FL — SIGNIFICANT CHANGE UP (ref 80–100)
MONOCYTES # BLD AUTO: 0.5 K/UL — SIGNIFICANT CHANGE UP (ref 0–0.9)
MONOCYTES NFR BLD AUTO: 10.1 % — SIGNIFICANT CHANGE UP (ref 2–14)
NEUTROPHILS # BLD AUTO: 3.35 K/UL — SIGNIFICANT CHANGE UP (ref 1.8–7.4)
NEUTROPHILS NFR BLD AUTO: 67.9 % — SIGNIFICANT CHANGE UP (ref 43–77)
NRBC # BLD: 0 /100 WBCS — SIGNIFICANT CHANGE UP (ref 0–0)
NRBC BLD-RTO: 0 /100 WBCS — SIGNIFICANT CHANGE UP (ref 0–0)
PLATELET # BLD AUTO: 150 K/UL — SIGNIFICANT CHANGE UP (ref 150–400)
RBC # BLD: 4.62 M/UL — SIGNIFICANT CHANGE UP (ref 3.8–5.2)
RBC # FLD: 12.8 % — SIGNIFICANT CHANGE UP (ref 10.3–14.5)
WBC # BLD: 4.94 K/UL — SIGNIFICANT CHANGE UP (ref 3.8–10.5)
WBC # FLD AUTO: 4.94 K/UL — SIGNIFICANT CHANGE UP (ref 3.8–10.5)

## 2025-02-12 DIAGNOSIS — C82.90 FOLLICULAR LYMPHOMA, UNSPECIFIED, UNSPECIFIED SITE: ICD-10-CM

## 2025-02-13 ENCOUNTER — RESULT REVIEW (OUTPATIENT)
Age: 75
End: 2025-02-13

## 2025-02-13 ENCOUNTER — APPOINTMENT (OUTPATIENT)
Dept: INFUSION THERAPY | Facility: CLINIC | Age: 75
End: 2025-02-13

## 2025-02-13 VITALS
OXYGEN SATURATION: 96 % | BODY MASS INDEX: 28.59 KG/M2 | DIASTOLIC BLOOD PRESSURE: 75 MMHG | SYSTOLIC BLOOD PRESSURE: 129 MMHG | HEART RATE: 79 BPM | TEMPERATURE: 97.9 F | HEIGHT: 66.5 IN | WEIGHT: 180 LBS

## 2025-02-13 LAB
BASOPHILS # BLD AUTO: 0.04 K/UL — SIGNIFICANT CHANGE UP (ref 0–0.2)
BASOPHILS NFR BLD AUTO: 0.8 % — SIGNIFICANT CHANGE UP (ref 0–2)
EOSINOPHIL # BLD AUTO: 0.05 K/UL — SIGNIFICANT CHANGE UP (ref 0–0.5)
EOSINOPHIL NFR BLD AUTO: 1 % — SIGNIFICANT CHANGE UP (ref 0–6)
HCT VFR BLD CALC: 39.8 % — SIGNIFICANT CHANGE UP (ref 34.5–45)
HGB BLD-MCNC: 13.5 G/DL — SIGNIFICANT CHANGE UP (ref 11.5–15.5)
IMM GRANULOCYTES NFR BLD AUTO: 0.4 % — SIGNIFICANT CHANGE UP (ref 0–0.9)
LYMPHOCYTES # BLD AUTO: 0.89 K/UL — LOW (ref 1–3.3)
LYMPHOCYTES # BLD AUTO: 17.5 % — SIGNIFICANT CHANGE UP (ref 13–44)
MCHC RBC-ENTMCNC: 30.3 PG — SIGNIFICANT CHANGE UP (ref 27–34)
MCHC RBC-ENTMCNC: 33.9 G/DL — SIGNIFICANT CHANGE UP (ref 32–36)
MCV RBC AUTO: 89.2 FL — SIGNIFICANT CHANGE UP (ref 80–100)
MONOCYTES # BLD AUTO: 0.42 K/UL — SIGNIFICANT CHANGE UP (ref 0–0.9)
MONOCYTES NFR BLD AUTO: 8.2 % — SIGNIFICANT CHANGE UP (ref 2–14)
NEUTROPHILS # BLD AUTO: 3.68 K/UL — SIGNIFICANT CHANGE UP (ref 1.8–7.4)
NEUTROPHILS NFR BLD AUTO: 72.1 % — SIGNIFICANT CHANGE UP (ref 43–77)
NRBC BLD AUTO-RTO: 0 /100 WBCS — SIGNIFICANT CHANGE UP (ref 0–0)
PLATELET # BLD AUTO: 181 K/UL — SIGNIFICANT CHANGE UP (ref 150–400)
RBC # BLD: 4.46 M/UL — SIGNIFICANT CHANGE UP (ref 3.8–5.2)
RBC # FLD: 13 % — SIGNIFICANT CHANGE UP (ref 10.3–14.5)
WBC # BLD: 5.1 K/UL — SIGNIFICANT CHANGE UP (ref 3.8–10.5)
WBC # FLD AUTO: 5.1 K/UL — SIGNIFICANT CHANGE UP (ref 3.8–10.5)

## 2025-02-20 ENCOUNTER — RESULT REVIEW (OUTPATIENT)
Age: 75
End: 2025-02-20

## 2025-02-20 ENCOUNTER — APPOINTMENT (OUTPATIENT)
Dept: INFUSION THERAPY | Facility: CLINIC | Age: 75
End: 2025-02-20

## 2025-02-20 VITALS
OXYGEN SATURATION: 97 % | TEMPERATURE: 98.2 F | DIASTOLIC BLOOD PRESSURE: 62 MMHG | SYSTOLIC BLOOD PRESSURE: 91 MMHG | HEIGHT: 66.5 IN | WEIGHT: 180 LBS | HEART RATE: 86 BPM | BODY MASS INDEX: 28.59 KG/M2

## 2025-02-20 LAB
BASOPHILS # BLD AUTO: 0.02 K/UL — SIGNIFICANT CHANGE UP (ref 0–0.2)
BASOPHILS NFR BLD AUTO: 0.3 % — SIGNIFICANT CHANGE UP (ref 0–2)
EOSINOPHIL # BLD AUTO: 0.06 K/UL — SIGNIFICANT CHANGE UP (ref 0–0.5)
EOSINOPHIL NFR BLD AUTO: 0.8 % — SIGNIFICANT CHANGE UP (ref 0–6)
HCT VFR BLD CALC: 41 % — SIGNIFICANT CHANGE UP (ref 34.5–45)
HGB BLD-MCNC: 13.8 G/DL — SIGNIFICANT CHANGE UP (ref 11.5–15.5)
IMM GRANULOCYTES NFR BLD AUTO: 0.3 % — SIGNIFICANT CHANGE UP (ref 0–0.9)
LYMPHOCYTES # BLD AUTO: 0.95 K/UL — LOW (ref 1–3.3)
LYMPHOCYTES # BLD AUTO: 12.2 % — LOW (ref 13–44)
MCHC RBC-ENTMCNC: 30.2 PG — SIGNIFICANT CHANGE UP (ref 27–34)
MCHC RBC-ENTMCNC: 33.7 G/DL — SIGNIFICANT CHANGE UP (ref 32–36)
MCV RBC AUTO: 89.7 FL — SIGNIFICANT CHANGE UP (ref 80–100)
MONOCYTES # BLD AUTO: 0.75 K/UL — SIGNIFICANT CHANGE UP (ref 0–0.9)
MONOCYTES NFR BLD AUTO: 9.6 % — SIGNIFICANT CHANGE UP (ref 2–14)
NEUTROPHILS # BLD AUTO: 6 K/UL — SIGNIFICANT CHANGE UP (ref 1.8–7.4)
NEUTROPHILS NFR BLD AUTO: 76.8 % — SIGNIFICANT CHANGE UP (ref 43–77)
NRBC BLD AUTO-RTO: 0 /100 WBCS — SIGNIFICANT CHANGE UP (ref 0–0)
PLATELET # BLD AUTO: 167 K/UL — SIGNIFICANT CHANGE UP (ref 150–400)
RBC # BLD: 4.57 M/UL — SIGNIFICANT CHANGE UP (ref 3.8–5.2)
RBC # FLD: 12.8 % — SIGNIFICANT CHANGE UP (ref 10.3–14.5)
WBC # BLD: 7.8 K/UL — SIGNIFICANT CHANGE UP (ref 3.8–10.5)
WBC # FLD AUTO: 7.8 K/UL — SIGNIFICANT CHANGE UP (ref 3.8–10.5)

## 2025-02-21 ENCOUNTER — APPOINTMENT (OUTPATIENT)
Dept: FAMILY MEDICINE | Facility: CLINIC | Age: 75
End: 2025-02-21
Payer: COMMERCIAL

## 2025-02-21 VITALS
SYSTOLIC BLOOD PRESSURE: 118 MMHG | TEMPERATURE: 97.4 F | HEART RATE: 85 BPM | HEIGHT: 66.5 IN | WEIGHT: 177 LBS | OXYGEN SATURATION: 96 % | DIASTOLIC BLOOD PRESSURE: 70 MMHG | BODY MASS INDEX: 28.11 KG/M2

## 2025-02-21 DIAGNOSIS — Z00.00 ENCOUNTER FOR GENERAL ADULT MEDICAL EXAMINATION W/OUT ABNORMAL FINDINGS: ICD-10-CM

## 2025-02-21 DIAGNOSIS — F41.9 ANXIETY DISORDER, UNSPECIFIED: ICD-10-CM

## 2025-02-21 DIAGNOSIS — F32.A ANXIETY DISORDER, UNSPECIFIED: ICD-10-CM

## 2025-02-21 PROCEDURE — 36415 COLL VENOUS BLD VENIPUNCTURE: CPT

## 2025-02-21 PROCEDURE — 99397 PER PM REEVAL EST PAT 65+ YR: CPT

## 2025-02-28 ENCOUNTER — RESULT REVIEW (OUTPATIENT)
Age: 75
End: 2025-02-28

## 2025-02-28 ENCOUNTER — APPOINTMENT (OUTPATIENT)
Dept: HEMATOLOGY ONCOLOGY | Facility: CLINIC | Age: 75
End: 2025-02-28

## 2025-02-28 VITALS
TEMPERATURE: 98.4 F | BODY MASS INDEX: 28.75 KG/M2 | WEIGHT: 181 LBS | DIASTOLIC BLOOD PRESSURE: 67 MMHG | HEIGHT: 66.5 IN | SYSTOLIC BLOOD PRESSURE: 125 MMHG | HEART RATE: 86 BPM | OXYGEN SATURATION: 97 %

## 2025-02-28 DIAGNOSIS — R59.1 GENERALIZED ENLARGED LYMPH NODES: ICD-10-CM

## 2025-02-28 DIAGNOSIS — C82.90 FOLLICULAR LYMPHOMA, UNSPECIFIED, UNSPECIFIED SITE: ICD-10-CM

## 2025-02-28 DIAGNOSIS — R53.83 OTHER FATIGUE: ICD-10-CM

## 2025-02-28 LAB
BASOPHILS # BLD AUTO: 0.03 K/UL — SIGNIFICANT CHANGE UP (ref 0–0.2)
BASOPHILS NFR BLD AUTO: 0.5 % — SIGNIFICANT CHANGE UP (ref 0–2)
EOSINOPHIL # BLD AUTO: 0.07 K/UL — SIGNIFICANT CHANGE UP (ref 0–0.5)
EOSINOPHIL NFR BLD AUTO: 1.2 % — SIGNIFICANT CHANGE UP (ref 0–6)
HCT VFR BLD CALC: 43.3 % — SIGNIFICANT CHANGE UP (ref 34.5–45)
HGB BLD-MCNC: 14.4 G/DL — SIGNIFICANT CHANGE UP (ref 11.5–15.5)
IMM GRANULOCYTES NFR BLD AUTO: 0.3 % — SIGNIFICANT CHANGE UP (ref 0–0.9)
LYMPHOCYTES # BLD AUTO: 0.95 K/UL — LOW (ref 1–3.3)
LYMPHOCYTES # BLD AUTO: 15.7 % — SIGNIFICANT CHANGE UP (ref 13–44)
MCHC RBC-ENTMCNC: 30.5 PG — SIGNIFICANT CHANGE UP (ref 27–34)
MCHC RBC-ENTMCNC: 33.3 G/DL — SIGNIFICANT CHANGE UP (ref 32–36)
MCV RBC AUTO: 91.7 FL — SIGNIFICANT CHANGE UP (ref 80–100)
MONOCYTES # BLD AUTO: 0.61 K/UL — SIGNIFICANT CHANGE UP (ref 0–0.9)
MONOCYTES NFR BLD AUTO: 10.1 % — SIGNIFICANT CHANGE UP (ref 2–14)
NEUTROPHILS # BLD AUTO: 4.38 K/UL — SIGNIFICANT CHANGE UP (ref 1.8–7.4)
NEUTROPHILS NFR BLD AUTO: 72.2 % — SIGNIFICANT CHANGE UP (ref 43–77)
NRBC BLD AUTO-RTO: 0 /100 WBCS — SIGNIFICANT CHANGE UP (ref 0–0)
PLATELET # BLD AUTO: 178 K/UL — SIGNIFICANT CHANGE UP (ref 150–400)
RBC # BLD: 4.72 M/UL — SIGNIFICANT CHANGE UP (ref 3.8–5.2)
RBC # FLD: 12.6 % — SIGNIFICANT CHANGE UP (ref 10.3–14.5)
WBC # BLD: 6.06 K/UL — SIGNIFICANT CHANGE UP (ref 3.8–10.5)
WBC # FLD AUTO: 6.06 K/UL — SIGNIFICANT CHANGE UP (ref 3.8–10.5)

## 2025-02-28 PROCEDURE — 99215 OFFICE O/P EST HI 40 MIN: CPT

## 2025-02-28 RX ORDER — ONDANSETRON HCL/PF 4 MG/2 ML
1 VIAL (ML) INJECTION
Refills: 0 | DISCHARGE
Start: 2025-02-28

## 2025-02-28 RX ORDER — ONDANSETRON 8 MG/1
8 TABLET, ORALLY DISINTEGRATING ORAL EVERY 8 HOURS
Qty: 90 | Refills: 0 | Status: ACTIVE | COMMUNITY
Start: 2025-02-28 | End: 1900-01-01

## 2025-03-01 LAB
ALBUMIN SERPL ELPH-MCNC: 4.3 G/DL
ALP BLD-CCNC: 88 U/L
ALT SERPL-CCNC: 9 U/L
ANION GAP SERPL CALC-SCNC: 9 MMOL/L
AST SERPL-CCNC: 19 U/L
BILIRUB SERPL-MCNC: 0.3 MG/DL
BUN SERPL-MCNC: 17 MG/DL
CALCIUM SERPL-MCNC: 8.9 MG/DL
CHLORIDE SERPL-SCNC: 103 MMOL/L
CO2 SERPL-SCNC: 29 MMOL/L
CREAT SERPL-MCNC: 0.73 MG/DL
EGFR: 87 ML/MIN/1.73M2
GLUCOSE SERPL-MCNC: 118 MG/DL
LDH SERPL-CCNC: 175 U/L
POTASSIUM SERPL-SCNC: 4.2 MMOL/L
PROT SERPL-MCNC: 6.5 G/DL
SODIUM SERPL-SCNC: 142 MMOL/L

## 2025-03-07 ENCOUNTER — RESULT REVIEW (OUTPATIENT)
Age: 75
End: 2025-03-07

## 2025-03-10 ENCOUNTER — APPOINTMENT (OUTPATIENT)
Dept: CT IMAGING | Facility: CLINIC | Age: 75
End: 2025-03-10
Payer: COMMERCIAL

## 2025-03-10 ENCOUNTER — OUTPATIENT (OUTPATIENT)
Dept: OUTPATIENT SERVICES | Facility: HOSPITAL | Age: 75
LOS: 1 days | End: 2025-03-10
Payer: COMMERCIAL

## 2025-03-10 DIAGNOSIS — C82.90 FOLLICULAR LYMPHOMA, UNSPECIFIED, UNSPECIFIED SITE: ICD-10-CM

## 2025-03-10 PROCEDURE — 71260 CT THORAX DX C+: CPT | Mod: 26

## 2025-03-10 PROCEDURE — 74177 CT ABD & PELVIS W/CONTRAST: CPT

## 2025-03-10 PROCEDURE — 71260 CT THORAX DX C+: CPT

## 2025-03-10 PROCEDURE — 74177 CT ABD & PELVIS W/CONTRAST: CPT | Mod: 26

## 2025-03-13 ENCOUNTER — RESULT REVIEW (OUTPATIENT)
Age: 75
End: 2025-03-13

## 2025-03-13 ENCOUNTER — APPOINTMENT (OUTPATIENT)
Dept: INFUSION THERAPY | Facility: CLINIC | Age: 75
End: 2025-03-13

## 2025-03-13 VITALS
BODY MASS INDEX: 29.22 KG/M2 | OXYGEN SATURATION: 96 % | SYSTOLIC BLOOD PRESSURE: 106 MMHG | HEART RATE: 81 BPM | HEIGHT: 66.5 IN | DIASTOLIC BLOOD PRESSURE: 65 MMHG | TEMPERATURE: 98.4 F | WEIGHT: 184 LBS

## 2025-03-13 LAB
BASOPHILS # BLD AUTO: 0.03 K/UL — SIGNIFICANT CHANGE UP (ref 0–0.2)
BASOPHILS NFR BLD AUTO: 0.7 % — SIGNIFICANT CHANGE UP (ref 0–2)
EOSINOPHIL # BLD AUTO: 0.08 K/UL — SIGNIFICANT CHANGE UP (ref 0–0.5)
EOSINOPHIL NFR BLD AUTO: 1.8 % — SIGNIFICANT CHANGE UP (ref 0–6)
HCT VFR BLD CALC: 39.8 % — SIGNIFICANT CHANGE UP (ref 34.5–45)
HGB BLD-MCNC: 13.3 G/DL — SIGNIFICANT CHANGE UP (ref 11.5–15.5)
IMM GRANULOCYTES NFR BLD AUTO: 0 % — SIGNIFICANT CHANGE UP (ref 0–0.9)
LYMPHOCYTES # BLD AUTO: 0.91 K/UL — LOW (ref 1–3.3)
LYMPHOCYTES # BLD AUTO: 20.4 % — SIGNIFICANT CHANGE UP (ref 13–44)
MCHC RBC-ENTMCNC: 30.2 PG — SIGNIFICANT CHANGE UP (ref 27–34)
MCHC RBC-ENTMCNC: 33.4 G/DL — SIGNIFICANT CHANGE UP (ref 32–36)
MCV RBC AUTO: 90.5 FL — SIGNIFICANT CHANGE UP (ref 80–100)
MONOCYTES # BLD AUTO: 0.49 K/UL — SIGNIFICANT CHANGE UP (ref 0–0.9)
MONOCYTES NFR BLD AUTO: 11 % — SIGNIFICANT CHANGE UP (ref 2–14)
NEUTROPHILS # BLD AUTO: 2.96 K/UL — SIGNIFICANT CHANGE UP (ref 1.8–7.4)
NEUTROPHILS NFR BLD AUTO: 66.1 % — SIGNIFICANT CHANGE UP (ref 43–77)
NRBC BLD AUTO-RTO: 0 /100 WBCS — SIGNIFICANT CHANGE UP (ref 0–0)
PLATELET # BLD AUTO: 156 K/UL — SIGNIFICANT CHANGE UP (ref 150–400)
RBC # BLD: 4.4 M/UL — SIGNIFICANT CHANGE UP (ref 3.8–5.2)
RBC # FLD: 12.7 % — SIGNIFICANT CHANGE UP (ref 10.3–14.5)
WBC # BLD: 4.47 K/UL — SIGNIFICANT CHANGE UP (ref 3.8–10.5)
WBC # FLD AUTO: 4.47 K/UL — SIGNIFICANT CHANGE UP (ref 3.8–10.5)

## 2025-03-14 ENCOUNTER — APPOINTMENT (OUTPATIENT)
Dept: INFUSION THERAPY | Facility: CLINIC | Age: 75
End: 2025-03-14

## 2025-03-14 VITALS
DIASTOLIC BLOOD PRESSURE: 69 MMHG | HEIGHT: 66.5 IN | OXYGEN SATURATION: 98 % | WEIGHT: 186 LBS | SYSTOLIC BLOOD PRESSURE: 114 MMHG | TEMPERATURE: 98.2 F | HEART RATE: 77 BPM | BODY MASS INDEX: 29.54 KG/M2

## 2025-03-14 DIAGNOSIS — C82.90 FOLLICULAR LYMPHOMA, UNSPECIFIED, UNSPECIFIED SITE: ICD-10-CM

## 2025-03-14 RX ORDER — METOCLOPRAMIDE 10 MG/1
10 TABLET ORAL
Qty: 120 | Refills: 1 | Status: ACTIVE | COMMUNITY
Start: 2025-03-14 | End: 1900-01-01

## 2025-03-14 RX ORDER — METOCLOPRAMIDE HCL 10 MG
1 TABLET ORAL
Refills: 1 | DISCHARGE
Start: 2025-03-14

## 2025-03-17 ENCOUNTER — INPATIENT (INPATIENT)
Facility: HOSPITAL | Age: 75
LOS: 0 days | Discharge: HOME | DRG: 176 | End: 2025-03-18
Attending: STUDENT IN AN ORGANIZED HEALTH CARE EDUCATION/TRAINING PROGRAM | Admitting: FAMILY MEDICINE
Payer: COMMERCIAL

## 2025-03-17 ENCOUNTER — NON-APPOINTMENT (OUTPATIENT)
Age: 75
End: 2025-03-17

## 2025-03-17 ENCOUNTER — RESULT REVIEW (OUTPATIENT)
Age: 75
End: 2025-03-17

## 2025-03-17 VITALS
TEMPERATURE: 98 F | HEART RATE: 68 BPM | SYSTOLIC BLOOD PRESSURE: 137 MMHG | DIASTOLIC BLOOD PRESSURE: 78 MMHG | RESPIRATION RATE: 18 BRPM | OXYGEN SATURATION: 100 % | HEIGHT: 66 IN

## 2025-03-17 DIAGNOSIS — I26.93 SINGLE SUBSEGMENTAL THROMBOTIC PULMONARY EMBOLISM WITHOUT ACUTE COR PULMONALE: ICD-10-CM

## 2025-03-17 DIAGNOSIS — Z71.6 TOBACCO ABUSE COUNSELING: ICD-10-CM

## 2025-03-17 DIAGNOSIS — I26.99 OTHER PULMONARY EMBOLISM WITHOUT ACUTE COR PULMONALE: ICD-10-CM

## 2025-03-17 DIAGNOSIS — Z71.3 DIETARY COUNSELING AND SURVEILLANCE: ICD-10-CM

## 2025-03-17 DIAGNOSIS — Z71.41 ALCOHOL ABUSE COUNSELING AND SURVEILLANCE OF ALCOHOLIC: ICD-10-CM

## 2025-03-17 DIAGNOSIS — Z71.82 EXERCISE COUNSELING: ICD-10-CM

## 2025-03-17 LAB
ALBUMIN SERPL ELPH-MCNC: 3.4 G/DL — SIGNIFICANT CHANGE UP (ref 3.3–5)
ALT FLD-CCNC: 20 U/L — SIGNIFICANT CHANGE UP (ref 12–78)
APTT BLD: 178.5 SEC — CRITICAL HIGH (ref 24.5–35.6)
APTT BLD: 26.5 SEC — SIGNIFICANT CHANGE UP (ref 24.5–35.6)
AST SERPL-CCNC: 14 U/L — LOW (ref 15–37)
BASOPHILS # BLD AUTO: 0.02 K/UL — SIGNIFICANT CHANGE UP (ref 0–0.2)
BASOPHILS # BLD MANUAL: 0 K/UL — SIGNIFICANT CHANGE UP (ref 0–0.2)
BASOPHILS NFR BLD AUTO: 0.4 % — SIGNIFICANT CHANGE UP (ref 0–2)
BASOPHILS NFR BLD MANUAL: 0 % — SIGNIFICANT CHANGE UP (ref 0–2)
BILIRUB SERPL-MCNC: 0.4 MG/DL — SIGNIFICANT CHANGE UP (ref 0.2–1.2)
BUN SERPL-MCNC: 13 MG/DL — SIGNIFICANT CHANGE UP (ref 7–23)
CALCIUM SERPL-MCNC: 8.9 MG/DL — SIGNIFICANT CHANGE UP (ref 8.5–10.1)
CHLORIDE SERPL-SCNC: 104 MMOL/L — SIGNIFICANT CHANGE UP (ref 96–108)
CO2 SERPL-SCNC: 30 MMOL/L — SIGNIFICANT CHANGE UP (ref 22–31)
CREAT SERPL-MCNC: 0.65 MG/DL — SIGNIFICANT CHANGE UP (ref 0.5–1.3)
EGFR: 93 ML/MIN/1.73M2 — SIGNIFICANT CHANGE UP
EOSINOPHIL # BLD AUTO: 0.07 K/UL — SIGNIFICANT CHANGE UP (ref 0–0.5)
EOSINOPHIL # BLD MANUAL: 0 K/UL — SIGNIFICANT CHANGE UP (ref 0–0.5)
EOSINOPHIL NFR BLD AUTO: 1.3 % — SIGNIFICANT CHANGE UP (ref 0–6)
EOSINOPHIL NFR BLD MANUAL: 0 % — SIGNIFICANT CHANGE UP (ref 0–6)
GLUCOSE SERPL-MCNC: 98 MG/DL — SIGNIFICANT CHANGE UP (ref 70–99)
HCT VFR BLD CALC: 38.5 % — SIGNIFICANT CHANGE UP (ref 34.5–45)
HCT VFR BLD CALC: 38.6 % — SIGNIFICANT CHANGE UP (ref 34.5–45)
HGB BLD-MCNC: 12.7 G/DL — SIGNIFICANT CHANGE UP (ref 11.5–15.5)
HGB BLD-MCNC: 12.7 G/DL — SIGNIFICANT CHANGE UP (ref 11.5–15.5)
IMM GRANULOCYTES # BLD AUTO: 0.02 K/UL — SIGNIFICANT CHANGE UP (ref 0–0.07)
IMM GRANULOCYTES NFR BLD AUTO: 0.4 % — SIGNIFICANT CHANGE UP (ref 0–0.9)
INR BLD: 0.89 RATIO — SIGNIFICANT CHANGE UP (ref 0.85–1.16)
LYMPHOCYTES # BLD AUTO: 0.31 K/UL — LOW (ref 1–3.3)
LYMPHOCYTES # BLD MANUAL: 0.27 K/UL — LOW (ref 1–3.3)
LYMPHOCYTES NFR BLD AUTO: 5.8 % — LOW (ref 13–44)
LYMPHOCYTES NFR BLD MANUAL: 5 % — LOW (ref 13–44)
MANUAL SMEAR VERIFICATION: SIGNIFICANT CHANGE UP
MCHC RBC-ENTMCNC: 30.1 PG — SIGNIFICANT CHANGE UP (ref 27–34)
MCHC RBC-ENTMCNC: 30.4 PG — SIGNIFICANT CHANGE UP (ref 27–34)
MCHC RBC-ENTMCNC: 32.9 G/DL — SIGNIFICANT CHANGE UP (ref 32–36)
MCHC RBC-ENTMCNC: 33 G/DL — SIGNIFICANT CHANGE UP (ref 32–36)
MCV RBC AUTO: 91.5 FL — SIGNIFICANT CHANGE UP (ref 80–100)
MCV RBC AUTO: 92.1 FL — SIGNIFICANT CHANGE UP (ref 80–100)
MONOCYTES # BLD MANUAL: 0.64 K/UL — SIGNIFICANT CHANGE UP (ref 0–0.9)
MONOCYTES NFR BLD AUTO: 10.5 % — SIGNIFICANT CHANGE UP (ref 2–14)
MONOCYTES NFR BLD MANUAL: 12 % — SIGNIFICANT CHANGE UP (ref 2–14)
NEUTROPHILS # BLD AUTO: 4.36 K/UL — SIGNIFICANT CHANGE UP (ref 1.8–7.4)
NEUTROPHILS # BLD MANUAL: 4.43 K/UL — SIGNIFICANT CHANGE UP (ref 1.8–7.4)
NEUTROPHILS NFR BLD AUTO: 81.6 % — HIGH (ref 43–77)
NEUTROPHILS NFR BLD MANUAL: 83 % — HIGH (ref 43–77)
NRBC # BLD AUTO: 0 K/UL — SIGNIFICANT CHANGE UP (ref 0–0)
NRBC # BLD AUTO: 0 K/UL — SIGNIFICANT CHANGE UP (ref 0–0)
NRBC # FLD: 0 K/UL — SIGNIFICANT CHANGE UP (ref 0–0)
NRBC # FLD: 0 K/UL — SIGNIFICANT CHANGE UP (ref 0–0)
NRBC BLD AUTO-RTO: 0 /100 WBCS — SIGNIFICANT CHANGE UP (ref 0–0)
NRBC BLD AUTO-RTO: 0 /100 WBCS — SIGNIFICANT CHANGE UP (ref 0–0)
NT-PROBNP SERPL-SCNC: 600 PG/ML — HIGH (ref 0–125)
PLAT MORPH BLD: NORMAL — SIGNIFICANT CHANGE UP
PLATELET # BLD AUTO: 135 K/UL — LOW (ref 150–400)
PLATELET # BLD AUTO: 140 K/UL — LOW (ref 150–400)
PMV BLD: 11.9 FL — SIGNIFICANT CHANGE UP (ref 7–13)
PMV BLD: 12.3 FL — SIGNIFICANT CHANGE UP (ref 7–13)
POTASSIUM SERPL-MCNC: 3.7 MMOL/L — SIGNIFICANT CHANGE UP (ref 3.5–5.3)
POTASSIUM SERPL-SCNC: 3.7 MMOL/L — SIGNIFICANT CHANGE UP (ref 3.5–5.3)
PROT SERPL-MCNC: 6.5 GM/DL — SIGNIFICANT CHANGE UP (ref 6–8.3)
PROTHROM AB SERPL-ACNC: 10.3 SEC — SIGNIFICANT CHANGE UP (ref 9.9–13.4)
RBC # BLD: 4.18 M/UL — SIGNIFICANT CHANGE UP (ref 3.8–5.2)
RBC # BLD: 4.22 M/UL — SIGNIFICANT CHANGE UP (ref 3.8–5.2)
RBC # FLD: 12.8 % — SIGNIFICANT CHANGE UP (ref 10.3–14.5)
RBC # FLD: 12.8 % — SIGNIFICANT CHANGE UP (ref 10.3–14.5)
RBC BLD AUTO: NORMAL — SIGNIFICANT CHANGE UP
SODIUM SERPL-SCNC: 139 MMOL/L — SIGNIFICANT CHANGE UP (ref 135–145)
TROPONIN I, HIGH SENSITIVITY RESULT: 5.42 NG/L — SIGNIFICANT CHANGE UP
TROPONIN I, HIGH SENSITIVITY RESULT: 6.81 NG/L — SIGNIFICANT CHANGE UP
WBC # BLD: 4.61 K/UL — SIGNIFICANT CHANGE UP (ref 3.8–10.5)
WBC # BLD: 5.34 K/UL — SIGNIFICANT CHANGE UP (ref 3.8–10.5)
WBC # FLD AUTO: 4.61 K/UL — SIGNIFICANT CHANGE UP (ref 3.8–10.5)
WBC # FLD AUTO: 5.34 K/UL — SIGNIFICANT CHANGE UP (ref 3.8–10.5)
WBC MORPHOLOGY: NORMAL — SIGNIFICANT CHANGE UP

## 2025-03-17 PROCEDURE — 93005 ELECTROCARDIOGRAM TRACING: CPT

## 2025-03-17 PROCEDURE — 93970 EXTREMITY STUDY: CPT | Mod: 26

## 2025-03-17 PROCEDURE — 99291 CRITICAL CARE FIRST HOUR: CPT

## 2025-03-17 PROCEDURE — 93970 EXTREMITY STUDY: CPT

## 2025-03-17 PROCEDURE — 93306 TTE W/DOPPLER COMPLETE: CPT | Mod: 26

## 2025-03-17 PROCEDURE — 84100 ASSAY OF PHOSPHORUS: CPT

## 2025-03-17 PROCEDURE — 71275 CT ANGIOGRAPHY CHEST: CPT | Mod: 26

## 2025-03-17 PROCEDURE — 80053 COMPREHEN METABOLIC PANEL: CPT

## 2025-03-17 PROCEDURE — 85027 COMPLETE CBC AUTOMATED: CPT

## 2025-03-17 PROCEDURE — 85730 THROMBOPLASTIN TIME PARTIAL: CPT

## 2025-03-17 PROCEDURE — 94640 AIRWAY INHALATION TREATMENT: CPT

## 2025-03-17 PROCEDURE — 99222 1ST HOSP IP/OBS MODERATE 55: CPT

## 2025-03-17 PROCEDURE — 36415 COLL VENOUS BLD VENIPUNCTURE: CPT

## 2025-03-17 PROCEDURE — 83735 ASSAY OF MAGNESIUM: CPT

## 2025-03-17 PROCEDURE — 71045 X-RAY EXAM CHEST 1 VIEW: CPT | Mod: 26

## 2025-03-17 PROCEDURE — 84484 ASSAY OF TROPONIN QUANT: CPT

## 2025-03-17 RX ORDER — HEPARIN SODIUM 1000 [USP'U]/ML
3000 INJECTION INTRAVENOUS; SUBCUTANEOUS EVERY 6 HOURS
Refills: 0 | Status: DISCONTINUED | OUTPATIENT
Start: 2025-03-17 | End: 2025-03-18

## 2025-03-17 RX ORDER — HEPARIN SODIUM 1000 [USP'U]/ML
6500 INJECTION INTRAVENOUS; SUBCUTANEOUS ONCE
Refills: 0 | Status: COMPLETED | OUTPATIENT
Start: 2025-03-17 | End: 2025-03-17

## 2025-03-17 RX ORDER — SENNA 187 MG
2 TABLET ORAL AT BEDTIME
Refills: 0 | Status: DISCONTINUED | OUTPATIENT
Start: 2025-03-17 | End: 2025-03-18

## 2025-03-17 RX ORDER — FLUOXETINE HYDROCHLORIDE 20 MG/1
20 CAPSULE ORAL DAILY
Refills: 0 | Status: DISCONTINUED | OUTPATIENT
Start: 2025-03-17 | End: 2025-03-18

## 2025-03-17 RX ORDER — MONTELUKAST SODIUM 10 MG/1
10 TABLET ORAL AT BEDTIME
Refills: 0 | Status: DISCONTINUED | OUTPATIENT
Start: 2025-03-17 | End: 2025-03-18

## 2025-03-17 RX ORDER — ALBUTEROL SULFATE 2.5 MG/3ML
1 VIAL, NEBULIZER (ML) INHALATION EVERY 6 HOURS
Refills: 0 | Status: DISCONTINUED | OUTPATIENT
Start: 2025-03-17 | End: 2025-03-18

## 2025-03-17 RX ORDER — HEPARIN SODIUM 1000 [USP'U]/ML
INJECTION INTRAVENOUS; SUBCUTANEOUS
Qty: 25000 | Refills: 0 | Status: DISCONTINUED | OUTPATIENT
Start: 2025-03-17 | End: 2025-03-18

## 2025-03-17 RX ORDER — HEPARIN SODIUM 1000 [USP'U]/ML
6500 INJECTION INTRAVENOUS; SUBCUTANEOUS EVERY 6 HOURS
Refills: 0 | Status: DISCONTINUED | OUTPATIENT
Start: 2025-03-17 | End: 2025-03-18

## 2025-03-17 RX ADMIN — Medication 1 DOSE(S): at 21:52

## 2025-03-17 RX ADMIN — HEPARIN SODIUM 6500 UNIT(S): 1000 INJECTION INTRAVENOUS; SUBCUTANEOUS at 14:48

## 2025-03-17 RX ADMIN — HEPARIN SODIUM 0 UNIT(S)/HR: 1000 INJECTION INTRAVENOUS; SUBCUTANEOUS at 22:05

## 2025-03-17 RX ADMIN — HEPARIN SODIUM 1200 UNIT(S)/HR: 1000 INJECTION INTRAVENOUS; SUBCUTANEOUS at 23:18

## 2025-03-17 RX ADMIN — MONTELUKAST SODIUM 10 MILLIGRAM(S): 10 TABLET ORAL at 23:23

## 2025-03-17 RX ADMIN — Medication 75 MILLILITER(S): at 20:54

## 2025-03-17 RX ADMIN — HEPARIN SODIUM 1500 UNIT(S)/HR: 1000 INJECTION INTRAVENOUS; SUBCUTANEOUS at 21:40

## 2025-03-17 RX ADMIN — HEPARIN SODIUM 1500 UNIT(S)/HR: 1000 INJECTION INTRAVENOUS; SUBCUTANEOUS at 14:49

## 2025-03-17 NOTE — H&P ADULT - HISTORY OF PRESENT ILLNESS
75 y/o F with known recent dx of follicular lymphoma currently on cancer directed therapy through [facility] Dr Moy with most recent dosing 03/13, presented to the ED with ongoing SOB / CP preceded LLE calf pain / tightness. 75 y/o female with a PMHx of non-hodgkin's lymphoma presents to the ED c/o chest discomfort. Pt states she started a new chemotherapy cycle on Thursday or Friday and on Saturday she had chest pressure and shortness of breath symptoms. Pt states she called her doctor (Dr. Lynch) who instructed her to come into the ED for further evaluation. Pt denies any sick contacts or recent travels. 75 y/o F with known recent dx of follicular lymphoma currently on cancer directed therapy through [facility] Dr Moy with most recent dosing , presented to the ED with ongoing SOB / CP preceded LLE calf pain / tightness. She states that the chest discomfort feels like someone sitting on top of her chest; Associated with shortness of breath. Patient denies any syncope, dizziness, n/v/d, jaw pain or left arm pain.. Pt states she started a new chemotherapy cycle on Thursday or Friday and on Saturday she had chest pressure and shortness of breath symptoms. She called her doctor (Dr. Lynch) who instructed her to come into the ED for further evaluation. Pt denies any sick contacts or recent travels.    In the ER, patient was found to have PE; [facility] team was called and patient was started on heparin Drip.     Past Medical History: Follicular Lymphoma on chemo,   Past Surgical History: Appendectomy, Right Knee and Hip surgery, CSection  Social History: Lives at home. No tobacco, alcohol or illicit drug use   Family History: mother  in her 90s from old age and father  from Polycythemia Vera and clot

## 2025-03-17 NOTE — ED ADULT NURSE NOTE - CHIEF COMPLAINT QUOTE
pt sent by MD, first round chemo therapy on Thursday/Friday, developed chest tightness on Saturday and SOB. Denies cardiac HX, positive nausea

## 2025-03-17 NOTE — ED ADULT NURSE REASSESSMENT NOTE - NS ED NURSE REASSESS COMMENT FT1
Pt aox3, resp unlabored, no SOB noted, 02 Sat 96%RA. pt denies chest pain. Heparin IV gtt infusing at 15ml/hr, no signs of obvious bleeding noted. Appetite good, voiding q shift without difficulty. Next aptt at 2020. VSS.  at bedside.

## 2025-03-17 NOTE — CONSULT NOTE ADULT - NS ATTEND AMEND GEN_ALL_CORE FT
Agree with above. Low risk presentation. Suspect should do well with anticoagulation. Findings relayed to patient and patient's family.    Thank you for allowing me to participate in the care of your patient. Feel free to contact me if any clinical issues arise via contact information below or MS Teams.    Derrick Driver MD, FACC, [facility]   Director, Interventional Cardiology, [address], [facility] Floor, [address] Office: [phone]  [place] Office: [phone]  Email: [email or web address]

## 2025-03-17 NOTE — H&P ADULT - REASON FOR ADMISSION
Chest pain 75 y/o F with recently dx'ed follicular lymphoma currently receiving cancer directed therapy, developed acute onset SOB / CP, found to have acute PE      # Acute PE in setting of Follicular Lymphoma (Grade 1, Stage III)    - 12/2024 Mammo / US breast showed unilateral R axillary LAD; s/p bx that revealed Grade 1 follicular lymphoma; cells positive for CD20, PAX5, CD10, BCL-2, BCL6, CD23, IgD; cells negative for CD30, cyclin D1, MUM1, and T cell markers  - After discussion with Heme Onc Dr Moy, determined to initiate therapy with Rituxan q weekly x4 doses then add in Bendamustine x4 doses   - She completed Rituxan only 02/20/24  - Follow up CT CAP imaging 03/11/25 revealed no new sites of neoplastic disease; slight interval decrease in size of previously seen prominent R axillary LNs; otherwise, no significant change in nonspecific prominent bilateral iliac chain and inguinal LNs  - Received Rituxan plus Bendamustine 03/13/24   - Acutely developed LLE cramping followed by SOB / CP  - Presented to the ED with CT imaging revealing acute RML segmental & subsegmental PEs  - Currently receiving Heparin gtt  - No planned inpatient cancer directed therapy  - Preferred admission to    - Will likely be transitioned to [facility] within the next 24 - 48 hours pending clinical course   - Will need close outpatient follow up with Primary Heme On[facility] Dr Moy SOB / CP      # Acute PE in setting of Follicular Lymphoma (Grade 1, Stage III)    - 12/2024 Mammo / US breast showed unilateral R axillary LAD; s/p bx that revealed Grade 1 follicular lymphoma; cells positive for CD20, PAX5, CD10, BCL-2, BCL6, CD23, IgD; cells negative for CD30, cyclin D1, MUM1, and T cell markers  - After discussion with Heme Onc Dr Moy, determined to initiate therapy with Rituxan q weekly x4 doses then add in Bendamustine x4 doses   - She completed Rituxan only 02/20/24  - Follow up CT CAP imaging 03/11/25 revealed no new sites of neoplastic disease; slight interval decrease in size of previously seen prominent R axillary LNs; otherwise, no significant change in nonspecific prominent bilateral iliac chain and inguinal LNs  - Received Rituxan plus Bendamustine 03/13/24   - Acutely developed LLE cramping followed by SOB / CP  - Presented to the ED with CT imaging revealing acute RML segmental & subsegmental PEs  - Currently receiving Heparin gtt  - No planned inpatient cancer directed therapy  - Preferred admission to    - Will likely be transitioned to [facility] within the next 24 - 48 hours pending clinical course   - Will need close outpatient follow up with Primary Heme On[facility] Dr Moy

## 2025-03-17 NOTE — CONSULT NOTE ADULT - ASSESSMENT
73 y/o F with recently dx'ed follicular lymphoma currently receiving cancer directed therapy, developed acute onset SOB / CP, found to have acute PE      # Acute PE in setting of Follicular Lymphoma (Grade 1, Stage III)    - 12/2024 Mammo / US breast showed unilateral R axillary LAD; s/p bx that revealed Grade 1 follicular lymphoma; cells positive for CD20, PAX5, CD10, BCL-2, BCL6, CD23, IgD; cells negative for CD30, cyclin D1, MUM1, and T cell markers  - After discussion with Heme Onc Dr Moy, determined to initiate therapy with Rituxan q weekly x4 doses then add in Bendamustine x4 doses   - She completed Rituxan only 02/20/24  - Follow up CT CAP imaging 03/11/25 revealed no new sites of neoplastic disease; slight interval decrease in size of previously seen prominent R axillary LNs; otherwise, no significant change in nonspecific prominent bilateral iliac chain and inguinal LNs  - Received Rituxan plus Bendamustine 03/13/24   - Acutely developed LLE cramping followed by SOB / CP  - Presented to the ED with CT imaging revealing acute RML segmental & subsegmental PEs  - Currently receiving Heparin gtt  - No planned inpatient cancer directed therapy  - Preferred admission to    - Will likely be transitioned to [facility] within the next 24 - 48 hours pending clinical course   - Will need close outpatient follow up with [facility] KIRSTEN Zhou-C  Hematology Oncology   [facility] [place]  [phone]

## 2025-03-17 NOTE — ED PROVIDER NOTE - CLINICAL SUMMARY MEDICAL DECISION MAKING FREE TEXT BOX
73 y/o female presents with chest pressure and shortness of breath. Plan to check labs, x-ray and CT scan.

## 2025-03-17 NOTE — PATIENT PROFILE ADULT - VISION (WITH CORRECTIVE LENSES IF THE PATIENT USUALLY WEARS THEM):
wears glasses all the time/Partially impaired: cannot see medication labels or newsprint, but can see obstacles in path, and the surrounding layout; can count fingers at arm's length

## 2025-03-17 NOTE — CONSULT NOTE ADULT - SUBJECTIVE AND OBJECTIVE BOX
[facility] Vascular Cardiology Team Note                                                              [facility] /[facility] Faculty Practice                                                         Office:  [address] Cardiac Clinic 1st Floor - [facility] [address]                                                                     Telephone: [phone]. Fax:[phone]                                                                 [facility] PERT CARDIOLOGY CONSULTATION NOTE     Patient is a 74y old  Female who presents with a chief complaint of SOB / CP      Time Presented:   Patient location at presentation: [ ] ED, [ ] Med/Surg regional, [ ] Med/Surg tele, [ ] ICU, [ ] Outside hospital transfer   Time of CT angiogram:  Time PERT notified:    HISTORY OF PRESENT ILLNESS:  HPI:  73 y/o F with known recent dx of follicular lymphoma currently on cancer directed therapy through Primary Heme Onc Dr Moy with most recent dosing , presented to the ED with ongoing SOB / CP preceded LLE calf pain / tightness. She states that the chest discomfort feels like someone sitting on top of her chest; Associated with shortness of breath. Patient denies any syncope, dizziness, n/v/d, jaw pain or left arm pain.. Pt states she started a new chemotherapy cycle on Thursday or Friday and on Saturday she had chest pressure and shortness of breath symptoms. She called her doctor (Dr. Lynch) who instructed her to come into the ED for further evaluation. Pt denies any sick contacts or recent travels.    In the ER, patient was found to have PE; PERC team was called and patient was started on heparin Drip.     Past Medical History: Follicular Lymphoma on chemo,   Past Surgical History: Appendectomy, Right Knee and Hip surgery, CSection  Social History: Lives at home. No tobacco, alcohol or illicit drug use   Family History: mother  in her 90s from old age and father  from Polycythemia Vera and clot   (17 Mar 2025 16:49)    3/17/25 [facility] consulted for Acute right middle lobe segmental/subsegmental pulmonary thromboemboli with no evidence of right heart strain, , troponin negative. Pt denies Hx PE/DVT. Father has  of PE with Hx of polycythemia vera. Denies CP, SOB. O2 sat 97% RA      Allergies    No Known Allergies    Intolerances    	    MEDICATIONS:  MEDICATIONS  (STANDING):  FLUoxetine 20 milliGRAM(s) Oral daily  fluticasone propionate/ salmeterol 250-50 MICROgram(s) Diskus 1 Dose(s) Inhalation two times a day  heparin  Infusion.  Unit(s)/Hr (15 mL/Hr) IV Continuous <Continuous>  montelukast 10 milliGRAM(s) Oral at bedtime  sodium chloride 0.9%. 1000 milliLiter(s) (75 mL/Hr) IV Continuous <Continuous>    Home Medications:  Arnuity Ellipta 200 mcg inhalation powder: 1 puff(s) inhaled once a day (17 Mar 2025 16:30)  Colace 100 mg oral capsule: 1 cap(s) orally once a day as needed for  constipation (17 Mar 2025 16:34)  Estrace Vaginal 0.1 mg/g vaginal cream: 1 application intravaginally 2 times a week (17 Mar 2025 16:34)  FLUoxetine 20 mg oral capsule: 1 cap(s) orally once a day (in the evening) (17 Mar 2025 16:30)  levalbuterol 45 mcg/inh inhalation aerosol: 2 puff(s) inhaled every 4 hours as needed for  shortness of breath and/or wheezing (17 Mar 2025 16:30)  Metoclopramide HCl - 10 MG Oral Tablet: 1 tab(s) orally every 6 hours as needed for  nausea (17 Mar 2025 16:31)  montelukast 10 mg oral tablet: 1 tab(s) orally once a day (at bedtime) (17 Mar 2025 16:31)  Ondansetron 8 MG Oral Tablet Disintegratin tab(s) orally every 8 hours as needed for  nausea (17 Mar 2025 16:32)  riTUXimab: once a month (17 Mar 2025 16:43)  Ruxience 10 mg/mL intravenous solution: intravenously once a month , Pt had Rituximab by Itself once before adding Bendamustine (17 Mar 2025 16:43)  Vivimusta 25 mg/mL intravenous solution: intravenously once a month (17 Mar 2025 16:43)        REVIEW OF SYSTEMS:  CONSTITUTIONAL: No fever, weight loss, chills  EYES: No eye pain, visual disturbances, or discharge  ENMT:  No difficulty hearing, tinnitus, vertigo; No sinus or throat pain  NECK: No pain or stiffness  RESPIRATORY: No cough, wheezing, hemoptysis, or shortness of breath  CARDIOVASCULAR: + chest pressure and SOB PTA. Denies  palpitations, dizziness, syncope, paroxysmal nocturnal dyspnea, orthopnea, or arm or leg swelling  GASTROINTESTINAL: No abdominal  or epigastric pain, nausea, vomiting, hematemesis, diarrhea, constipation, melena or bright red blood.  GENITOURINARY: No dysuria, nocturia, hematuria, or urinary incontinence  NEUROLOGICAL: No headaches, memory loss, slurred speech, limb weakness, loss of strength, numbness, or tremors  SKIN: No itching, burning, rashes, or lesions   MUSCULOSKELETAL: No joint pain or swelling, muscle, back, or extremity pain  PSYCHIATRIC: No depression, anxiety, or difficulty sleeping    [ ] Unable to obtain    PHYSICAL EXAM:  T(C): 36.6 (25 @ 15:01), Max: 36.8 (25 @ 12:42)  HR: 68 (25 @ 17:36) (65 - 68)  BP: 139/85 (25 @ 17:36) (135/75 - 139/85)  RR: 18 (25 @ 17:36) (17 - 18)  SpO2: 98% (25 @ 17:36) (98% - 100%)  Wt(kg): --  I&O's Summary      GENERAL: NAD, AAOx3  CHEST/LUNG: Clear to auscultation bilaterally; No wheeze  HEART: s1 s2 Regular rate and rhythm; No murmurs, rubs, or gallops  ABDOMEN: Soft, Nontender, Nondistended; Bowel sounds present X 4 quadrants   EXTREMITIES:  2+ Peripheral Pulses, No clubbing, cyanosis, or edema  SKIN: No rashes or lesions,  b/l LE not red, cool to touch,  no open skin no drainage  NEURO: nonfocal CN/motor/sensory/reflexes  Psych: normal affect and behavior, calm and cooperative     Vascular Pulse Exam:  Right DP: [x]palpable []non-palpable []audible      Left DP :   [x]palpable []non-palpable []audible  Right PT: []palpable [] non-palpable []audible   Left PT:  [] palpable [] non-palpable []audible     LABS                        12.7   5.34  )-----------( 140      ( 17 Mar 2025 13:09 )             38.5         139  |  104  |  13  ----------------------------<  98  3.7   |  30  |  0.65    Ca    8.9      17 Mar 2025 13:09    TPro  6.5  /  Alb  3.4  /  TBili  0.4  /  DBili  x   /  AST  14[L]  /  ALT  20  /  AlkPhos  69  -    PT/INR - ( 17 Mar 2025 13:09 )   PT: 10.3 sec;   INR: 0.89 ratio         PTT - ( 17 Mar 2025 13:09 )  PTT:26.5 sec      Urinalysis Basic - ( 17 Mar 2025 13:09 )    Color: x / Appearance: x / SG: x / pH: x  Gluc: 98 mg/dL / Ketone: x  / Bili: x / Urobili: x   Blood: x / Protein: x / Nitrite: x   Leuk Esterase: x / RBC: x / WBC x   Sq Epi: x / Non Sq Epi: x / Bacteria: x          RADIOLOGY :    US Duplex:  < from: US Duplex Venous Lower Ext Complete, Bilateral (25 @ 16:14) >  IGHT:  Normal compressibility of the RIGHT common femoral, femoral and popliteal   veins. Doppler examination shows normal spontaneous and phasic flow. No   RIGHT calf vein thrombosis is detected. Soleal vein is not visualized.    LEFT:  Normal compressibility of the LEFT common femoral, femoral and popliteal   veins. Doppler examination shows normal spontaneousand phasic flow. No   LEFT calf vein thrombosis is detected. Nonspecific left groin lymph node   measures 2.2 x 0.8 x 1.2 cm.    IMPRESSION:    No acute DVT of the right or left lower extremity, of the visualized   venous segments as above. Right soleal vein is not visualized.    Nonspecific left groin lymph node measures 2.2 x 0.8 x 1.2 cm.    < end of copied text >      CTA- chest  < from: CT Angio Chest PE Protocol w/ IV Cont (25 @ 13:58) >  FINDINGS:    HEART/VASCULATURE: Normal sized heart and aorta. No pericardial effusion.   New pulmonary thromboemboli within segmental and subsegmental branches of   the middle lobe.    LUNGS/AIRWAYS/PLEURA: Patent trachea and bronchi. Clear lungs. No pleural   effusion.    LYMPH NODES/MEDIASTINUM: No mediastinal lymphadenopathy. Unchanged mildly   enlarged right axillary lymph node.    UPPER ABDOMEN: Unremarkable.    BONES/SOFT TISSUES: Unremarkable.      IMPRESSION:    Acute right middle lobe segmental/subsegmental pulmonary thromboemboli.   No evidence of right heart strain    < end of copied text >        ECHO   < from: TTE W or WO Ultrasound Enhancing Agent (25 @ 14:14) >  1. Left ventricular cavity is normal in size.   2. Normal right ventricular cavity size.   3. Left atrium is mildly dilated.   4. The right atrium is normal in size.   5. Mild mitral regurgitation.   6. Structurally normal pulmonic valve with normal leaflet excursion.   7. Structurally normal mitral valve with normal leaflet excursion.   8. Structurally normal tricuspid valve with normal leaflet excursion. Mild tricuspid regurgitation.   9. Informed the attending Dr. Dupree of the echo findings.  10. There is mild calcification of the mitral valve annulus.  11. Tricuspid aortic valve with normal leaflet excursion.    < end of copied text >    EKG: NSR -no change noted from previous    REITE Bleeding Risk:   [ ] Recent Major Bleeding (2pt)  [ ] Creatinine > 1.2 mg/dL ( 1.5pt)  [ ] Anemia (<13 g/dL M, < 12 g/dL F) (1.5 pt)  [ x] Malignancy History (1pt)  [ x] Clinically - overt PE (1pt)  [ ] Age > 75 (1pt)  Total: 2    sPESI score: High d/t Ca   Age >80 [ ], History of Cancer [x ], Hx of chronic cardiopulmonary disease [ ], Heart rate > 110 [ ], Systolic BP <100 [ ], SpO2 <90% [ ]  mMRC score:  Patient will  ambulated in AM---  73 y/o F with known recent dx of follicular lymphoma currently on cancer directed therapy through Primary Heme Onc Dr Moy with most recent dosing , presented to the ED with ongoing SOB / CP preceded LLE calf pain / tightness associated with shortness of breath. Patient denies any syncope, dizziness. PERT consulted for Acute right middle lobe segmental/subsegmental pulmonary thromboemboli.  No evidence of right heart strain on TTE, , troponin negative. PESI class intermediate   Plan  -Heparin gtt with therapeutic APTT for 24-48 hours then transition to DOAC  - Ambulate tomorrow and monitor for any signs of decompensation   -Further management per primary care  - discussed with Dr. Driver   -Please reconsult our team if needed                                                                   [facility] Vascular Cardiology Team Note                                                              [facility] /[facility] Faculty Practice                                                         Office:  [address] Cardiac Clinic 1st Floor - [facility] [address]                                                                     Telephone: [phone]. Fax:[phone]                                                                 [facility] PERT CARDIOLOGY CONSULTATION NOTE     Patient is a 74y old  Female who presents with a chief complaint of SOB / CP      Time Presented: 2  Patient location at presentation: [ x] ED, [ ] Med/Surg regional, [ ] Med/Surg tele, [ ] ICU, [ ] Outside hospital transfer   Time of CT angiogram:1358  Time PERT notified: 1420    HISTORY OF PRESENT ILLNESS:  HPI:  73 y/o F with known recent dx of follicular lymphoma currently on cancer directed therapy through Primary Heme Onc Dr Moy with most recent dosing , presented to the ED with ongoing SOB / CP preceded LLE calf pain / tightness. She states that the chest discomfort feels like someone sitting on top of her chest; Associated with shortness of breath. Patient denies any syncope, dizziness, n/v/d, jaw pain or left arm pain.. Pt states she started a new chemotherapy cycle on Thursday or Friday and on Saturday she had chest pressure and shortness of breath symptoms. She called her doctor (Dr. Lynch) who instructed her to come into the ED for further evaluation. Pt denies any sick contacts or recent travels.    In the ER, patient was found to have PE; PERC team was called and patient was started on heparin Drip.     Past Medical History: Follicular Lymphoma on chemo,   Past Surgical History: Appendectomy, Right Knee and Hip surgery, CSection  Social History: Lives at home. No tobacco, alcohol or illicit drug use   Family History: mother  in her 90s from old age and father  from Polycythemia Vera and clot   (17 Mar 2025 16:49)    3/17/25 [facility] consulted for Acute right middle lobe segmental/subsegmental pulmonary thromboemboli with no evidence of right heart strain, , troponin negative. Pt denies Hx PE/DVT. Father has  of PE with Hx of polycythemia vera. Denies CP, SOB. O2 sat 97% RA      Allergies    No Known Allergies    Intolerances    	    MEDICATIONS:  MEDICATIONS  (STANDING):  FLUoxetine 20 milliGRAM(s) Oral daily  fluticasone propionate/ salmeterol 250-50 MICROgram(s) Diskus 1 Dose(s) Inhalation two times a day  heparin  Infusion.  Unit(s)/Hr (15 mL/Hr) IV Continuous <Continuous>  montelukast 10 milliGRAM(s) Oral at bedtime  sodium chloride 0.9%. 1000 milliLiter(s) (75 mL/Hr) IV Continuous <Continuous>    Home Medications:  Arnuity Ellipta 200 mcg inhalation powder: 1 puff(s) inhaled once a day (17 Mar 2025 16:30)  Colace 100 mg oral capsule: 1 cap(s) orally once a day as needed for  constipation (17 Mar 2025 16:34)  Estrace Vaginal 0.1 mg/g vaginal cream: 1 application intravaginally 2 times a week (17 Mar 2025 16:34)  FLUoxetine 20 mg oral capsule: 1 cap(s) orally once a day (in the evening) (17 Mar 2025 16:30)  levalbuterol 45 mcg/inh inhalation aerosol: 2 puff(s) inhaled every 4 hours as needed for  shortness of breath and/or wheezing (17 Mar 2025 16:30)  Metoclopramide HCl - 10 MG Oral Tablet: 1 tab(s) orally every 6 hours as needed for  nausea (17 Mar 2025 16:31)  montelukast 10 mg oral tablet: 1 tab(s) orally once a day (at bedtime) (17 Mar 2025 16:31)  Ondansetron 8 MG Oral Tablet Disintegratin tab(s) orally every 8 hours as needed for  nausea (17 Mar 2025 16:32)  riTUXimab: once a month (17 Mar 2025 16:43)  Ruxience 10 mg/mL intravenous solution: intravenously once a month , Pt had Rituximab by Itself once before adding Bendamustine (17 Mar 2025 16:43)  Vivimusta 25 mg/mL intravenous solution: intravenously once a month (17 Mar 2025 16:43)        REVIEW OF SYSTEMS:  CONSTITUTIONAL: No fever, weight loss, chills  EYES: No eye pain, visual disturbances, or discharge  ENMT:  No difficulty hearing, tinnitus, vertigo; No sinus or throat pain  NECK: No pain or stiffness  RESPIRATORY: No cough, wheezing, hemoptysis, or shortness of breath  CARDIOVASCULAR: + chest pressure and SOB PTA. Denies  palpitations, dizziness, syncope, paroxysmal nocturnal dyspnea, orthopnea, or arm or leg swelling  GASTROINTESTINAL: No abdominal  or epigastric pain, nausea, vomiting, hematemesis, diarrhea, constipation, melena or bright red blood.  GENITOURINARY: No dysuria, nocturia, hematuria, or urinary incontinence  NEUROLOGICAL: No headaches, memory loss, slurred speech, limb weakness, loss of strength, numbness, or tremors  SKIN: No itching, burning, rashes, or lesions   MUSCULOSKELETAL: No joint pain or swelling, muscle, back, or extremity pain  PSYCHIATRIC: No depression, anxiety, or difficulty sleeping    [ ] Unable to obtain    PHYSICAL EXAM:  T(C): 36.6 (25 @ 15:01), Max: 36.8 (25 @ 12:42)  HR: 68 (25 @ 17:36) (65 - 68)  BP: 139/85 (25 @ 17:36) (135/75 - 139/85)  RR: 18 (25 @ 17:36) (17 - 18)  SpO2: 98% (25 @ 17:36) (98% - 100%)  Wt(kg): --  I&O's Summary      GENERAL: NAD, AAOx3  CHEST/LUNG: Clear to auscultation bilaterally; No wheeze  HEART: s1 s2 Regular rate and rhythm; No murmurs, rubs, or gallops  ABDOMEN: Soft, Nontender, Nondistended; Bowel sounds present X 4 quadrants   EXTREMITIES:  2+ Peripheral Pulses, No clubbing, cyanosis, or edema  SKIN: No rashes or lesions,  b/l LE not red, cool to touch,  no open skin no drainage  NEURO: nonfocal CN/motor/sensory/reflexes  Psych: normal affect and behavior, calm and cooperative     Vascular Pulse Exam:  Right DP: [x]palpable []non-palpable []audible      Left DP :   [x]palpable []non-palpable []audible  Right PT: []palpable [] non-palpable []audible   Left PT:  [] palpable [] non-palpable []audible     LABS                        12.7   5.34  )-----------( 140      ( 17 Mar 2025 13:09 )             38.5         139  |  104  |  13  ----------------------------<  98  3.7   |  30  |  0.65    Ca    8.9      17 Mar 2025 13:09    TPro  6.5  /  Alb  3.4  /  TBili  0.4  /  DBili  x   /  AST  14[L]  /  ALT  20  /  AlkPhos  69  -    PT/INR - ( 17 Mar 2025 13:09 )   PT: 10.3 sec;   INR: 0.89 ratio         PTT - ( 17 Mar 2025 13:09 )  PTT:26.5 sec      Urinalysis Basic - ( 17 Mar 2025 13:09 )    Color: x / Appearance: x / SG: x / pH: x  Gluc: 98 mg/dL / Ketone: x  / Bili: x / Urobili: x   Blood: x / Protein: x / Nitrite: x   Leuk Esterase: x / RBC: x / WBC x   Sq Epi: x / Non Sq Epi: x / Bacteria: x          RADIOLOGY :    US Duplex:  < from: US Duplex Venous Lower Ext Complete, Bilateral (25 @ 16:14) >  IGHT:  Normal compressibility of the RIGHT common femoral, femoral and popliteal   veins. Doppler examination shows normal spontaneous and phasic flow. No   RIGHT calf vein thrombosis is detected. Soleal vein is not visualized.    LEFT:  Normal compressibility of the LEFT common femoral, femoral and popliteal   veins. Doppler examination shows normal spontaneousand phasic flow. No   LEFT calf vein thrombosis is detected. Nonspecific left groin lymph node   measures 2.2 x 0.8 x 1.2 cm.    IMPRESSION:    No acute DVT of the right or left lower extremity, of the visualized   venous segments as above. Right soleal vein is not visualized.    Nonspecific left groin lymph node measures 2.2 x 0.8 x 1.2 cm.    < end of copied text >      CTA- chest  < from: CT Angio Chest PE Protocol w/ IV Cont (25 @ 13:58) >  FINDINGS:    HEART/VASCULATURE: Normal sized heart and aorta. No pericardial effusion.   New pulmonary thromboemboli within segmental and subsegmental branches of   the middle lobe.    LUNGS/AIRWAYS/PLEURA: Patent trachea and bronchi. Clear lungs. No pleural   effusion.    LYMPH NODES/MEDIASTINUM: No mediastinal lymphadenopathy. Unchanged mildly   enlarged right axillary lymph node.    UPPER ABDOMEN: Unremarkable.    BONES/SOFT TISSUES: Unremarkable.      IMPRESSION:    Acute right middle lobe segmental/subsegmental pulmonary thromboemboli.   No evidence of right heart strain    < end of copied text >        ECHO   < from: TTE W or WO Ultrasound Enhancing Agent (25 @ 14:14) >  1. Left ventricular cavity is normal in size.   2. Normal right ventricular cavity size.   3. Left atrium is mildly dilated.   4. The right atrium is normal in size.   5. Mild mitral regurgitation.   6. Structurally normal pulmonic valve with normal leaflet excursion.   7. Structurally normal mitral valve with normal leaflet excursion.   8. Structurally normal tricuspid valve with normal leaflet excursion. Mild tricuspid regurgitation.   9. Informed the attending Dr. Dupree of the echo findings.  10. There is mild calcification of the mitral valve annulus.  11. Tricuspid aortic valve with normal leaflet excursion.    < end of copied text >    EKG: NSR -no change noted from previous    REITE Bleeding Risk:   [ ] Recent Major Bleeding (2pt)  [ ] Creatinine > 1.2 mg/dL ( 1.5pt)  [ ] Anemia (<13 g/dL M, < 12 g/dL F) (1.5 pt)  [ x] Malignancy History (1pt)  [ x] Clinically - overt PE (1pt)  [ ] Age > 75 (1pt)  Total: 2    sPESI score: High d/t Ca   Age >80 [ ], History of Cancer [x ], Hx of chronic cardiopulmonary disease [ ], Heart rate > 110 [ ], Systolic BP <100 [ ], SpO2 <90% [ ]  mMRC score:  Patient will  ambulated in AM---  73 y/o F with known recent dx of follicular lymphoma currently on cancer directed therapy through Primary Heme Onc Dr Moy with most recent dosing , presented to the ED with ongoing SOB / CP preceded LLE calf pain / tightness associated with shortness of breath. Patient denies any syncope, dizziness. PERT consulted for Acute right middle lobe segmental/subsegmental pulmonary thromboemboli.  No evidence of right heart strain on TTE, , troponin negative. PESI class intermediate   Plan  -Heparin gtt with therapeutic APTT for 24-48 hours then transition to DOAC  - Ambulate tomorrow and monitor for any signs of decompensation   -Further management per primary care  - discussed with Dr. Driver   -Please reconsult our team if you have further questions.

## 2025-03-17 NOTE — PATIENT PROFILE ADULT - NSPROHMSYMPCOND_GEN_A_NUR
lymphoma (diagnosed Dec 2024) - currently receiving chemo at [facility].    Hx of asthma r/t allergies , IBS, arthritis/cancer/gastrointestinal/musculoskeletal/respiratory

## 2025-03-17 NOTE — H&P ADULT - NSHPREVIEWOFSYSTEMS_GEN_ALL_CORE
REVIEW OF SYSTEMS:    CONSTITUTIONAL: No weakness, fevers or chills  EYES/ENT: No visual changes; vertigo or throat pain   NECK: No pain or stiffness  RESPIRATORY: No cough, wheezing, hemoptysis or shortness of breath  CARDIOVASCULAR: No chest pain or palpitations  GASTROINTESTINAL: No abdominal or epigastric pain. No nausea, vomiting, or hematemesis; No diarrhea or constipation. No melena or hematochezia.  GENITOURINARY: No dysuria, urinary frequency or hematuria  NEUROLOGICAL: No numbness or weakness  EXTREMITIES: No swelling or tenderness  SKIN: No itching, burning, rashes, or lesions   All other review of systems is negative unless indicated above. REVIEW OF SYSTEMS:    CONSTITUTIONAL: No weakness, fevers or chills  EYES/ENT: No visual changes; vertigo or throat pain   NECK: No pain or stiffness  RESPIRATORY: No cough, wheezing, hemoptysis +shortness of breath  CARDIOVASCULAR: +chest pain and  palpitations  GASTROINTESTINAL: No abdominal or epigastric pain. No nausea, vomiting, or hematemesis; No diarrhea or constipation. No melena or hematochezia.  GENITOURINARY: No dysuria, urinary frequency or hematuria  NEUROLOGICAL: No numbness or weakness  EXTREMITIES: No swelling or tenderness  SKIN: No itching, burning, rashes, or lesions   All other review of systems is negative unless indicated above.

## 2025-03-17 NOTE — ED ADULT NURSE NOTE - OBJECTIVE STATEMENT
pt presents to the ED for chest discomfort. reports recently starting a new chemo medication for non Hodgkin's lymphoma. pt reports associated nausea and SOB. denies cardiac hx. no reported fevers. pt A&Ox4, well appearing, and ambulatory at baseline with no further complaints or discomforts reported at this time. safety and comfort maintained

## 2025-03-17 NOTE — CONSULT NOTE ADULT - SUBJECTIVE AND OBJECTIVE BOX
HPI:    73 y/o F with known recent dx of follicular lymphoma currently on cancer directed therapy through [facility] Dr Moy with most recent dosing 03/13, presented to the ED with ongoing SOB / CP preceded LLE calf pain / tightness.     03/17/25: Seen in the ED accompanied by spouse, no acute distress       PAST MEDICAL & SURGICAL HISTORY:    Follicular lymphoma       MEDICATIONS  (STANDING):    heparin  Infusion.  Unit(s)/Hr (15 mL/Hr) IV Continuous <Continuous>      MEDICATIONS  (PRN):    heparin   Injectable 6500 Unit(s) IV Push every 6 hours PRN For aPTT less than 40  heparin   Injectable 3000 Unit(s) IV Push every 6 hours PRN For aPTT between 40 - 57      ALLERGIES:    No Known Allergies        FAMILY HISTORY:    Not noted      REVIEW OF SYSTEMS:    Constitutional, Eyes, ENT, Cardiovascular, Respiratory, Gastrointestinal, Genitourinary, Musculoskeletal, Integumentary, Neurological, Psychiatric, Endocrine, Heme/Lymph and Allergic/Immunologic review of systems are otherwise negative except as noted in HPI.       VITALS:    T(C): 36.6 (17 Mar 2025 15:01), Max: 36.8 (17 Mar 2025 12:42)  T(F): 97.9 (17 Mar 2025 15:01), Max: 98.3 (17 Mar 2025 12:42)  HR: 65 (17 Mar 2025 15:01) (65 - 68)  BP: 135/75 (17 Mar 2025 15:01) (135/75 - 137/78)  BP(mean): 93 (17 Mar 2025 15:01) (93 - 97)  RR: 17 (17 Mar 2025 15:01) (17 - 18)  SpO2: 100% (17 Mar 2025 15:01) (100% - 100%)    Parameters below as of 17 Mar 2025 15:01  Patient On (Oxygen Delivery Method): room air        PHYSICAL:    Constitutional: no acute distress   Eyes: no conjunctival infection, anicteric  ENT: pharynx is unremarkable  Neck: supple without JVD   Pulmonary: clear to auscultation bilaterally   Cardiac: RRR   Vascular: no calf tenderness, venous stasis changes   Abdomen: normoactive bowel sounds, soft and nontender   Lymphatic: no peripheral adenopathy appreciated  Musculoskeletal: full range of motion and no deformities appreciated  Skin: normal appearance, no rash  Neurology: awake, alert, oriented        LABS:    CBC Full  -  ( 17 Mar 2025 13:09 )  WBC Count : 5.34 K/uL  RBC Count : 4.18 M/uL  Hemoglobin : 12.7 g/dL  Hematocrit : 38.5 %  Platelet Count - Automated : 140 K/uL  Mean Cell Volume : 92.1 fl  Mean Cell Hemoglobin : 30.4 pg  Mean Cell Hemoglobin Concentration : 33.0 g/dL  Auto Neutrophil # : 4.36 K/uL  Auto Lymphocyte # : 0.31 K/uL  Auto Monocyte # : 0.56 K/uL  Auto Eosinophil # : 0.07 K/uL  Auto Basophil # : 0.02 K/uL  Auto Neutrophil % : 81.6 %  Auto Lymphocyte % : 5.8 %  Auto Monocyte % : 10.5 %  Auto Eosinophil % : 1.3 %  Auto Basophil % : 0.4 %    03-17    139  |  104  |  13  ----------------------------<  98  3.7   |  30  |  0.65    Ca    8.9      17 Mar 2025 13:09    TPro  6.5  /  Alb  3.4  /  TBili  0.4  /  DBili  x   /  AST  14[L]  /  ALT  20  /  AlkPhos  69  03-17    PT/INR - ( 17 Mar 2025 13:09 )   PT: 10.3 sec;   INR: 0.89 ratio         PTT - ( 17 Mar 2025 13:09 )  PTT:26.5 sec  Urinalysis Basic - ( 17 Mar 2025 13:09 )    Color: x / Appearance: x / SG: x / pH: x  Gluc: 98 mg/dL / Ketone: x  / Bili: x / Urobili: x   Blood: x / Protein: x / Nitrite: x   Leuk Esterase: x / RBC: x / WBC x   Sq Epi: x / Non Sq Epi: x / Bacteria: x        RADIOLOGY & ADDITIONAL STUDIES:      CT Angio Chest PE Protocol w/ IV Cont (03.17.25 @ 13:58)    FINDINGS:    HEART/VASCULATURE: Normal sized heart and aorta. No pericardial effusion.   New pulmonary thromboemboli within segmental and subsegmental branches of   the middle lobe.    LUNGS/AIRWAYS/PLEURA: Patent trachea and bronchi. Clear lungs. No pleural   effusion.    LYMPH NODES/MEDIASTINUM: No mediastinal lymphadenopathy. Unchanged mildly   enlarged right axillary lymph node.    UPPER ABDOMEN: Unremarkable.    BONES/SOFT TISSUES: Unremarkable.      IMPRESSION:    Acute right middle lobe segmental/subsegmental pulmonary thromboemboli.   No evidence of right heart strain.

## 2025-03-17 NOTE — ED PROVIDER NOTE - OBJECTIVE STATEMENT
73 y/o female with a PMHx of non-hodgkin's lymphoma presents to the ED c/o chest discomfort. Pt states she started a new chemotherapy cycle on Thursday or Friday and on Saturday she had chest pressure and shortness of breath symptoms. Pt states she called her doctor (Dr. Lynch) who instructed her to come into the ED for further evaluation. Pt denies any sick contacts or recent travels.

## 2025-03-17 NOTE — PATIENT PROFILE ADULT - FALL HARM RISK - UNIVERSAL INTERVENTIONS
Bed in lowest position, wheels locked, appropriate side rails in place/Call bell, personal items and telephone in reach/Instruct patient to call for assistance before getting out of bed or chair/Non-slip footwear when patient is out of bed/[place] to call system/Physically safe environment - no spills, clutter or unnecessary equipment/Purposeful Proactive Rounding/Room/bathroom lighting operational, light cord in reach

## 2025-03-17 NOTE — H&P ADULT - ASSESSMENT
75 y/o F with recently dx'ed follicular lymphoma currently receiving cancer directed therapy, developed acute onset SOB / CP, found to have acute PE      # Acute PE in setting of Follicular Lymphoma (Grade 1, Stage III)    - 12/2024 Mammo / US breast showed unilateral R axillary LAD; s/p bx that revealed Grade 1 follicular lymphoma; cells positive for CD20, PAX5, CD10, BCL-2, BCL6, CD23, IgD; cells negative for CD30, cyclin D1, MUM1, and T cell markers  - After discussion with Heme Onc Dr Moy, determined to initiate therapy with Rituxan q weekly x4 doses then add in Bendamustine x4 doses   - She completed Rituxan only 02/20/24  - Follow up CT CAP imaging 03/11/25 revealed no new sites of neoplastic disease; slight interval decrease in size of previously seen prominent R axillary LNs; otherwise, no significant change in nonspecific prominent bilateral iliac chain and inguinal LNs  - Received Rituxan plus Bendamustine 03/13/24   - Acutely developed LLE cramping followed by SOB / CP  - Presented to the ED with CT imaging revealing acute RML segmental & subsegmental PEs  - Currently receiving Heparin gtt  - No planned inpatient cancer directed therapy  - Preferred admission to    - Will likely be transitioned to DOAC within the next 24 - 48 hours pending clinical course   - Will need close outpatient follow up with Primary Heme Onc Dr Moy  73 y/o F with recently dx'ed follicular lymphoma currently receiving cancer directed therapy, developed acute onset SOB / CP, found to have acute PE      # Acute PE   # Follicular Lymphoma (Grade 1, Stage III)  # Nonspecific left groin lymph node measures 2.2 x 0.8 x 1.2 cm.  - Admit MS with remote tele   - CT imaging revealing acute RML segmental & subsegmental PEs  - IVF  - serial troponin and EKG  - FU TTE  - FU Doppler of LE  - Received Rituxan plus Bendamustine 03/13/24   - Currently on Heparin gtt  - Will likely be transitioned to DOAC within the next  24 - 48 hours pending clinical course   - Hemeonc consult appreciated - Will need close outpatient follow up with Primary Heme Onc Dr Moy   - cardio consult     #VTE ppx  - Heparin Drip

## 2025-03-17 NOTE — H&P ADULT - NSHPLABSRESULTS_GEN_ALL_CORE
Lab Results:  CBC  CBC Full  -  ( 17 Mar 2025 13:09 )  WBC Count : 5.34 K/uL  RBC Count : 4.18 M/uL  Hemoglobin : 12.7 g/dL  Hematocrit : 38.5 %  Platelet Count - Automated : 140 K/uL  Mean Cell Volume : 92.1 fl  Mean Cell Hemoglobin : 30.4 pg  Mean Cell Hemoglobin Concentration : 33.0 g/dL  Auto Neutrophil # : 4.36 K/uL  Auto Lymphocyte # : 0.31 K/uL  Auto Monocyte # : 0.56 K/uL  Auto Eosinophil # : 0.07 K/uL  Auto Basophil # : 0.02 K/uL  Auto Neutrophil % : 81.6 %  Auto Lymphocyte % : 5.8 %  Auto Monocyte % : 10.5 %  Auto Eosinophil % : 1.3 %  Auto Basophil % : 0.4 %    .		Differential:	[] Automated		[] Manual  Chemistry                        12.7   5.34  )-----------( 140      ( 17 Mar 2025 13:09 )             38.5     03-17    139  |  104  |  13  ----------------------------<  98  3.7   |  30  |  0.65    Ca    8.9      17 Mar 2025 13:09    TPro  6.5  /  Alb  3.4  /  TBili  0.4  /  DBili  x   /  AST  14[L]  /  ALT  20  /  AlkPhos  69  03-17    LIVER FUNCTIONS - ( 17 Mar 2025 13:09 )  Alb: 3.4 g/dL / Pro: 6.5 gm/dL / ALK PHOS: 69 U/L / ALT: 20 U/L / AST: 14 U/L / GGT: x           PT/INR - ( 17 Mar 2025 13:09 )   PT: 10.3 sec;   INR: 0.89 ratio         PTT - ( 17 Mar 2025 13:09 )  PTT:26.5 sec  Urinalysis Basic - ( 17 Mar 2025 13:09 )    Color: x / Appearance: x / SG: x / pH: x  Gluc: 98 mg/dL / Ketone: x  / Bili: x / Urobili: x   Blood: x / Protein: x / Nitrite: x   Leuk Esterase: x / RBC: x / WBC x   Sq Epi: x / Non Sq Epi: x / Bacteria: x      RADIOLOGY RESULTS:    < from: US Duplex Venous Lower Ext Complete, Bilateral (03.17.25 @ 16:14) >      IMPRESSION:    No acute DVT of the right or left lower extremity, of the visualized   venous segments as above. Right soleal vein is not visualized.    Nonspecific left groin lymph node measures 2.2 x 0.8 x 1.2 cm.      < end of copied text >    < from: CT Angio Chest PE Protocol w/ IV Cont (03.17.25 @ 13:58) >      IMPRESSION:    Acute right middle lobe segmental/subsegmental pulmonary thromboemboli.   No evidence of right heart strain. This was discussed with Dr. Thompson at   2:20 PM on 3/17/2025.      < end of copied text >    < from: Xray Chest 1 View- PORTABLE-Urgent (03.17.25 @ 13:21) >      IMPRESSION:   No radiographic evidence of active chest disease..    < end of copied text >

## 2025-03-18 ENCOUNTER — TRANSCRIPTION ENCOUNTER (OUTPATIENT)
Age: 75
End: 2025-03-18

## 2025-03-18 ENCOUNTER — OUTPATIENT (OUTPATIENT)
Dept: OUTPATIENT SERVICES | Facility: HOSPITAL | Age: 75
LOS: 1 days | Discharge: HOME | End: 2025-03-18

## 2025-03-18 VITALS — OXYGEN SATURATION: 98 %

## 2025-03-18 DIAGNOSIS — I26.99 OTHER PULMONARY EMBOLISM WITHOUT ACUTE COR PULMONALE: ICD-10-CM

## 2025-03-18 DIAGNOSIS — R07.89 OTHER CHEST PAIN: ICD-10-CM

## 2025-03-18 DIAGNOSIS — C82.90 FOLLICULAR LYMPHOMA, UNSPECIFIED, UNSPECIFIED SITE: ICD-10-CM

## 2025-03-18 LAB
ALBUMIN SERPL ELPH-MCNC: 2.9 G/DL — LOW (ref 3.3–5)
ALP SERPL-CCNC: 60 U/L — SIGNIFICANT CHANGE UP (ref 40–120)
ALT FLD-CCNC: 16 U/L — SIGNIFICANT CHANGE UP (ref 12–78)
ANION GAP SERPL CALC-SCNC: 2 MMOL/L — LOW (ref 5–17)
APTT BLD: 35.9 SEC — HIGH (ref 24.5–35.6)
APTT BLD: 83.6 SEC — HIGH (ref 24.5–35.6)
AST SERPL-CCNC: 10 U/L — LOW (ref 15–37)
BILIRUB SERPL-MCNC: 0.6 MG/DL — SIGNIFICANT CHANGE UP (ref 0.2–1.2)
BUN SERPL-MCNC: 12 MG/DL — SIGNIFICANT CHANGE UP (ref 7–23)
CALCIUM SERPL-MCNC: 8.4 MG/DL — LOW (ref 8.5–10.1)
CHLORIDE SERPL-SCNC: 108 MMOL/L — SIGNIFICANT CHANGE UP (ref 96–108)
CO2 SERPL-SCNC: 31 MMOL/L — SIGNIFICANT CHANGE UP (ref 22–31)
EGFR: 99 ML/MIN/1.73M2 — SIGNIFICANT CHANGE UP
EGFR: 99 ML/MIN/1.73M2 — SIGNIFICANT CHANGE UP
GLUCOSE SERPL-MCNC: 97 MG/DL — SIGNIFICANT CHANGE UP (ref 70–99)
HCT VFR BLD CALC: 35.4 % — SIGNIFICANT CHANGE UP (ref 34.5–45)
HGB BLD-MCNC: 11.9 G/DL — SIGNIFICANT CHANGE UP (ref 11.5–15.5)
MAGNESIUM SERPL-MCNC: 1.8 MG/DL — SIGNIFICANT CHANGE UP (ref 1.6–2.6)
MCHC RBC-ENTMCNC: 30.7 PG — SIGNIFICANT CHANGE UP (ref 27–34)
MCHC RBC-ENTMCNC: 33.6 G/DL — SIGNIFICANT CHANGE UP (ref 32–36)
NRBC # BLD AUTO: 0 K/UL — SIGNIFICANT CHANGE UP (ref 0–0)
NRBC # FLD: 0 K/UL — SIGNIFICANT CHANGE UP (ref 0–0)
NRBC BLD AUTO-RTO: 0 /100 WBCS — SIGNIFICANT CHANGE UP (ref 0–0)
PHOSPHATE SERPL-MCNC: 3.3 MG/DL — SIGNIFICANT CHANGE UP (ref 2.5–4.5)
PLATELET # BLD AUTO: 116 K/UL — LOW (ref 150–400)
PMV BLD: 11.8 FL — SIGNIFICANT CHANGE UP (ref 7–13)
POTASSIUM SERPL-MCNC: 3.7 MMOL/L — SIGNIFICANT CHANGE UP (ref 3.5–5.3)
POTASSIUM SERPL-SCNC: 3.7 MMOL/L — SIGNIFICANT CHANGE UP (ref 3.5–5.3)
PROT SERPL-MCNC: 5.4 GM/DL — LOW (ref 6–8.3)
RBC # BLD: 3.87 M/UL — SIGNIFICANT CHANGE UP (ref 3.8–5.2)
RBC # FLD: 12.9 % — SIGNIFICANT CHANGE UP (ref 10.3–14.5)
SODIUM SERPL-SCNC: 141 MMOL/L — SIGNIFICANT CHANGE UP (ref 135–145)
TROPONIN I, HIGH SENSITIVITY RESULT: 5.5 NG/L — SIGNIFICANT CHANGE UP
WBC # BLD: 3.57 K/UL — LOW (ref 3.8–10.5)
WBC # FLD AUTO: 3.57 K/UL — LOW (ref 3.8–10.5)

## 2025-03-18 PROCEDURE — 93010 ELECTROCARDIOGRAM REPORT: CPT

## 2025-03-18 PROCEDURE — 99239 HOSP IP/OBS DSCHRG MGMT >30: CPT

## 2025-03-18 PROCEDURE — 99233 SBSQ HOSP IP/OBS HIGH 50: CPT

## 2025-03-18 PROCEDURE — 99223 1ST HOSP IP/OBS HIGH 75: CPT

## 2025-03-18 RX ORDER — ACETAMINOPHEN 500 MG/5ML
650 LIQUID (ML) ORAL ONCE
Refills: 0 | Status: COMPLETED | OUTPATIENT
Start: 2025-03-18 | End: 2025-03-18

## 2025-03-18 RX ORDER — APIXABAN 2.5 MG/1
2 TABLET, FILM COATED ORAL
Qty: 74 | Refills: 0
Start: 2025-03-18 | End: 2025-04-16

## 2025-03-18 RX ORDER — APIXABAN 2.5 MG/1
10 TABLET, FILM COATED ORAL EVERY 12 HOURS
Refills: 0 | Status: DISCONTINUED | OUTPATIENT
Start: 2025-03-18 | End: 2025-03-18

## 2025-03-18 RX ADMIN — HEPARIN SODIUM 1200 UNIT(S)/HR: 1000 INJECTION INTRAVENOUS; SUBCUTANEOUS at 07:25

## 2025-03-18 RX ADMIN — APIXABAN 10 MILLIGRAM(S): 2.5 TABLET, FILM COATED ORAL at 12:31

## 2025-03-18 RX ADMIN — Medication 1 DOSE(S): at 09:50

## 2025-03-18 RX ADMIN — FLUOXETINE HYDROCHLORIDE 20 MILLIGRAM(S): 20 CAPSULE ORAL at 10:17

## 2025-03-18 RX ADMIN — HEPARIN SODIUM 1200 UNIT(S)/HR: 1000 INJECTION INTRAVENOUS; SUBCUTANEOUS at 06:48

## 2025-03-18 RX ADMIN — Medication 650 MILLIGRAM(S): at 10:37

## 2025-03-18 NOTE — CONSULT NOTE ADULT - PROBLEM SELECTOR RECOMMENDATION 9
Chart reviewed. Echo reviewed. No evidence of right sided strain on ECG or Echo. Recommend continue anticoagulation as ordered. Pt appears hemodynamically stable

## 2025-03-18 NOTE — PROGRESS NOTE ADULT - ASSESSMENT
75 y/o F with recently dx'ed follicular lymphoma currently receiving cancer directed therapy, developed acute onset SOB / CP, found to have acute PE      # Acute PE in setting of Follicular Lymphoma (Grade 1, Stage III)    - 12/2024 Mammo / US breast showed unilateral R axillary LAD; s/p bx that revealed Grade 1 follicular lymphoma; cells positive for CD20, PAX5, CD10, BCL-2, BCL6, CD23, IgD; cells negative for CD30, cyclin D1, MUM1, and T cell markers  - After discussion with Heme Onc Dr Moy, determined to initiate therapy with Rituxan q weekly x4 doses then add in Bendamustine x4 doses   - She completed Rituxan only 02/20/24  - Follow up CT CAP imaging 03/11/25 revealed no new sites of neoplastic disease; slight interval decrease in size of previously seen prominent R axillary LNs; otherwise, no significant change in nonspecific prominent bilateral iliac chain and inguinal LNs  - Received Rituxan plus Bendamustine 03/13/24   - Acutely developed LLE cramping followed by SOB / CP  - Presented to the ED with CT imaging revealing acute RML segmental & subsegmental PEs  - Currently receiving Heparin gtt ---> transitioned to DOAC   - No planned inpatient cancer directed therapy  - Will need close outpatient follow up with Primary Heme Onc Dr Chinmay Mckinnon PA-C  Hematology Oncology   [facility]  [phone]

## 2025-03-18 NOTE — CONSULT NOTE ADULT - SUBJECTIVE AND OBJECTIVE BOX
PCP:    REQUESTING PHYSICIAN:    REASON FOR CONSULT:    CHIEF COMPLAINT:    HPI:  73 y/o F with known recent dx of follicular lymphoma currently on cancer directed therapy through Primary Heme Onc Dr Moy with most recent dosing , presented to the ED with ongoing SOB / CP preceded LLE calf pain / tightness. She states that the chest discomfort feels like someone sitting on top of her chest; Associated with shortness of breath. Patient denies any syncope, dizziness, n/v/d, jaw pain or left arm pain.. Pt states she started a new chemotherapy cycle on Thursday or Friday and on Saturday she had chest pressure and shortness of breath symptoms. She called her doctor (Dr. Lynch) who instructed her to come into the ED for further evaluation. Pt denies any sick contacts or recent travels.    Cardiology was called to evaluate symptoms and possible cardiac complications of PE. Pt feels well during today's exam. She denies any prior CV disease. She denies prior CP or PEARSON.    Past Medical History: Follicular Lymphoma on chemo,   Past Surgical History: Appendectomy, Right Knee and Hip surgery, CSection  Social History: Lives at home. No tobacco, alcohol or illicit drug use   Family History: mother  in her 90s from old age and father  from Polycythemia Vera and clot   (17 Mar 2025 16:49)      PAST MEDICAL & SURGICAL HISTORY:      Allergies    No Known Allergies    Intolerances        SOCIAL HISTORY:    FAMILY HISTORY:      MEDICATIONS:  MEDICATIONS  (STANDING):  apixaban 10 milliGRAM(s) Oral every 12 hours  FLUoxetine 20 milliGRAM(s) Oral daily  fluticasone propionate/ salmeterol 250-50 MICROgram(s) Diskus 1 Dose(s) Inhalation two times a day  montelukast 10 milliGRAM(s) Oral at bedtime  senna 2 Tablet(s) Oral at bedtime    MEDICATIONS  (PRN):  albuterol    90 MICROgram(s) HFA Inhaler 1 Puff(s) Inhalation every 6 hours PRN Shortness of Breath and/or Wheezing      REVIEW OF SYSTEMS:    CONSTITUTIONAL: No weakness, fevers or chills  EYES/ENT: No visual changes;  No vertigo or throat pain   NECK: No pain or stiffness  RESPIRATORY: No cough, wheezing, hemoptysis; No shortness of breath  CARDIOVASCULAR: No chest pain or palpitations  GASTROINTESTINAL: No abdominal or epigastric pain. No nausea, vomiting, or hematemesis; No diarrhea or constipation. No melena or hematochezia.  GENITOURINARY: No dysuria, frequency or hematuria  NEUROLOGICAL: No numbness or weakness  SKIN: No itching, burning, rashes, or lesions   All other review of systems is negative unless indicated above    Vital Signs Last 24 Hrs  T(C): 36.9 (18 Mar 2025 08:28), Max: 36.9 (17 Mar 2025 20:12)  T(F): 98.4 (18 Mar 2025 08:28), Max: 98.5 (17 Mar 2025 20:12)  HR: 70 (18 Mar 2025 09:50) (68 - 74)  BP: 136/72 (18 Mar 2025 08:28) (124/70 - 139/85)  BP(mean): 87 (17 Mar 2025 20:12) (87 - 100)  RR: 18 (18 Mar 2025 08:28) (18 - 18)  SpO2: 98% (18 Mar 2025 09:50) (94% - 99%)    Parameters below as of 18 Mar 2025 09:50  Patient On (Oxygen Delivery Method): room air        I&O's Summary    17 Mar 2025 07:01  -  18 Mar 2025 07:00  --------------------------------------------------------  IN: 0 mL / OUT: 300 mL / NET: -300 mL        PHYSICAL EXAM:    Constitutional: NAD, awake and alert, well-developed  HEENT: PERR, EOMI,  No oral cyananosis.  Neck:  supple,  No JVD  Respiratory: Breath sounds are clear bilaterally, No wheezing, rales or rhonchi  Cardiovascular: S1 and S2, regular rate and rhythm, no Murmurs, gallops or rubs  Gastrointestinal: Bowel Sounds present, soft, nontender.   Extremities: No peripheral edema. No clubbing or cyanosis.  Vascular: 2+ peripheral pulses  Neurological: A/O x 3, no focal deficits  Musculoskeletal: no calf tenderness.  Skin: No rashes.      LABS: All Labs Reviewed:                        11.9   3.57  )-----------( 116      ( 18 Mar 2025 05:13 )             35.4                         12.7   4.61  )-----------( 135      ( 17 Mar 2025 20:40 )             38.6                         12.7   5.34  )-----------( 140      ( 17 Mar 2025 13:09 )             38.5     18 Mar 2025 05:13    141    |  108    |  12     ----------------------------<  97     3.7     |  31     |  0.52   17 Mar 2025 13:09    139    |  104    |  13     ----------------------------<  98     3.7     |  30     |  0.65     Ca    8.4        18 Mar 2025 05:13  Ca    8.9        17 Mar 2025 13:09  Phos  3.3       18 Mar 2025 05:13  Mg     1.8       18 Mar 2025 05:13    TPro  5.4    /  Alb  2.9    /  TBili  0.6    /  DBili  x      /  AST  10     /  ALT  16     /  AlkPhos  60     18 Mar 2025 05:13  TPro  6.5    /  Alb  3.4    /  TBili  0.4    /  DBili  x      /  AST  14     /  ALT  20     /  AlkPhos  69     17 Mar 2025 13:09    PT/INR - ( 17 Mar 2025 13:09 )   PT: 10.3 sec;   INR: 0.89 ratio         PTT - ( 18 Mar 2025 13:02 )  PTT:35.9 sec      Blood Culture:         RADIOLOGY/EKG:< from: 12 Lead ECG (25 @ 08:53) >  Diagnosis Line Normal sinus rhythm  Normal ECG  When compared with ECG of 17-MAR-2025 12:43,  T wave inversion no longer evident in V2  Confirmed by LUKE SEARS MD ([id number]) on 3/18/2025 11:54:48 AM    < end of copied text >        < from: TTE W or WO Ultrasound Enhancing Agent (25 @ 14:14) >  CONCLUSIONS:      1. Left ventricular cavity is normal in size.   2. Normal right ventricular cavity size.   3. Left atrium is mildly dilated.   4. The right atrium is normal in size.   5. Mild mitral regurgitation.   6. Structurally normal pulmonic valve with normal leaflet excursion.   7. Structurally normal mitral valve with normal leaflet excursion.   8. Structurally normal tricuspid valve with normal leaflet excursion. Mild tricuspid regurgitation.   9. Informed the attending Dr. Dupree of the echo findings.  10. There is mild calcification of the mitral valve annulus.  11. Tricuspid aortic valve with normal leaflet excursion.    < end of copied text >

## 2025-03-18 NOTE — DISCHARGE NOTE PROVIDER - NSDCMRMEDTOKEN_GEN_ALL_CORE_FT
apixaban 5 mg oral tablet: 2 tab(s) orally every 12 hours Take 2 tablets twice daily for 7 days then one tablet twice daily thereafter  Arnuity Ellipta 200 mcg inhalation powder: 1 puff(s) inhaled once a day  Colace 100 mg oral capsule: 1 cap(s) orally once a day as needed for  constipation  Estrace Vaginal 0.1 mg/g vaginal cream: 1 application intravaginally 2 times a week  FLUoxetine 20 mg oral capsule: 1 cap(s) orally once a day (in the evening)  levalbuterol 45 mcg/inh inhalation aerosol: 2 puff(s) inhaled every 4 hours as needed for  shortness of breath and/or wheezing  Metoclopramide HCl - 10 MG Oral Tablet: 1 tab(s) orally every 6 hours as needed for  nausea  montelukast 10 mg oral tablet: 1 tab(s) orally once a day (at bedtime)  Ondansetron 8 MG Oral Tablet Disintegratin tab(s) orally every 8 hours as needed for  nausea  riTUXimab: once a month  Ruxience 10 mg/mL intravenous solution: intravenously once a month , Pt had Rituximab by Itself once before adding Bendamustine  Vivimusta 25 mg/mL intravenous solution: intravenously once a month

## 2025-03-18 NOTE — PROGRESS NOTE ADULT - NS ATTEND AMEND GEN_ALL_CORE FT
This is a 74 year old female who is followed by myself for recent diagnosis of follicular lymphoma.  She was treated with Bendamustine/rituximab on 3/13, 3/14- now with new pulmonary thromboemboli within segmental and subsegmental branches of the middle lobe.  No DVT on doppler but patient was symptomatic prior to onset of chemo.  Likely related to immobility.   No objection to DOAC, discharge plan to follow up with me on Friday.

## 2025-03-18 NOTE — CONSULT NOTE ADULT - REASON FOR ADMISSION
SOB / CP      # Acute PE in setting of Follicular Lymphoma (Grade 1, Stage III)    - 12/2024 Mammo / US breast showed unilateral R axillary LAD; s/p bx that revealed Grade 1 follicular lymphoma; cells positive for CD20, PAX5, CD10, BCL-2, BCL6, CD23, IgD; cells negative for CD30, cyclin D1, MUM1, and T cell markers  - After discussion with Heme Onc Dr Moy, determined to initiate therapy with Rituxan q weekly x4 doses then add in Bendamustine x4 doses   - She completed Rituxan only 02/20/24  - Follow up CT CAP imaging 03/11/25 revealed no new sites of neoplastic disease; slight interval decrease in size of previously seen prominent R axillary LNs; otherwise, no significant change in nonspecific prominent bilateral iliac chain and inguinal LNs  - Received Rituxan plus Bendamustine 03/13/24   - Acutely developed LLE cramping followed by SOB / CP  - Presented to the ED with CT imaging revealing acute RML segmental & subsegmental PEs  - Currently receiving Heparin gtt  - No planned inpatient cancer directed therapy  - Preferred admission to    - Will likely be transitioned to DOAC within the next 24 - 48 hours pending clinical course   - Will need close outpatient follow up with Primary Heme Onc Dr Moy
Acute PE
SOB / CP      # Acute PE in setting of Follicular Lymphoma (Grade 1, Stage III)    - 12/2024 Mammo / US breast showed unilateral R axillary LAD; s/p bx that revealed Grade 1 follicular lymphoma; cells positive for CD20, PAX5, CD10, BCL-2, BCL6, CD23, IgD; cells negative for CD30, cyclin D1, MUM1, and T cell markers  - After discussion with Heme Onc Dr Moy, determined to initiate therapy with Rituxan q weekly x4 doses then add in Bendamustine x4 doses   - She completed Rituxan only 02/20/24  - Follow up CT CAP imaging 03/11/25 revealed no new sites of neoplastic disease; slight interval decrease in size of previously seen prominent R axillary LNs; otherwise, no significant change in nonspecific prominent bilateral iliac chain and inguinal LNs  - Received Rituxan plus Bendamustine 03/13/24   - Acutely developed LLE cramping followed by SOB / CP  - Presented to the ED with CT imaging revealing acute RML segmental & subsegmental PEs  - Currently receiving Heparin gtt  - No planned inpatient cancer directed therapy  - Preferred admission to    - Will likely be transitioned to DOAC within the next 24 - 48 hours pending clinical course   - Will need close outpatient follow up with Primary Heme Onc Dr Moy

## 2025-03-18 NOTE — DISCHARGE NOTE PROVIDER - NSDCCPCAREPLAN_GEN_ALL_CORE_FT
PRINCIPAL DISCHARGE DIAGNOSIS  Diagnosis: Pulmonary embolism  Assessment and Plan of Treatment: Admitted for acute pulmonary embolism, started on a heparin infusion initially, now transitioned to eliquis, continue with 2 tablets twice daily for 7 days and then one tablet twice daily thereafter. Continue to follow up with your hematologist.

## 2025-03-18 NOTE — DISCHARGE NOTE NURSING/CASE MANAGEMENT/SOCIAL WORK - PATIENT PORTAL LINK FT
You can access the FollowMyHealth Patient Portal offered by [facility] by registering at the following website: [email or web address]. By joining [facility]’s FollowMyHealth portal, you will also be able to view your health information using other applications (apps) compatible with our system.

## 2025-03-18 NOTE — DISCHARGE NOTE PROVIDER - CARE PROVIDER_API CALL
Savi Moy  [address], [place] [address]  Phone: [phone]  Fax: [phone]  Follow Up Time:

## 2025-03-18 NOTE — DISCHARGE NOTE PROVIDER - NSDCQMERRANDS_GEN_ALL_CORE
From: Ayo Bauer  To: Stephen Moore  Sent: 4/21/2023 5:55 PM EDT  Subject: [facility] prescription     I picked up my first order. Can you change it to a 90 day supply for future orders? My insurance prefers that.     Thanks    No

## 2025-03-18 NOTE — PROGRESS NOTE ADULT - SUBJECTIVE AND OBJECTIVE BOX
HPI:    75 y/o F with known recent dx of follicular lymphoma currently on cancer directed therapy through [facility] Dr Moy with most recent dosing 03/13, presented to the ED with ongoing SOB / CP preceded LLE calf pain / tightness.     03/17/25: Seen in the ED accompanied by spouse, no acute distress     03/18/25: Seen at bedside, no acute distress       PAST MEDICAL & SURGICAL HISTORY:    Follicular lymphoma       MEDICATIONS  (STANDING):    heparin  Infusion.  Unit(s)/Hr (15 mL/Hr) IV Continuous <Continuous>      MEDICATIONS  (PRN):    heparin   Injectable 6500 Unit(s) IV Push every 6 hours PRN For aPTT less than 40  heparin   Injectable 3000 Unit(s) IV Push every 6 hours PRN For aPTT between 40 - 57      ALLERGIES:    No Known Allergies        FAMILY HISTORY:    Not noted      REVIEW OF SYSTEMS:    Constitutional, Eyes, ENT, Cardiovascular, Respiratory, Gastrointestinal, Genitourinary, Musculoskeletal, Integumentary, Neurological, Psychiatric, Endocrine, Heme/Lymph and Allergic/Immunologic review of systems are otherwise negative except as noted in HPI.       VITALS:    T(C): 36.6 (17 Mar 2025 15:01), Max: 36.8 (17 Mar 2025 12:42)  T(F): 97.9 (17 Mar 2025 15:01), Max: 98.3 (17 Mar 2025 12:42)  HR: 65 (17 Mar 2025 15:01) (65 - 68)  BP: 135/75 (17 Mar 2025 15:01) (135/75 - 137/78)  BP(mean): 93 (17 Mar 2025 15:01) (93 - 97)  RR: 17 (17 Mar 2025 15:01) (17 - 18)  SpO2: 100% (17 Mar 2025 15:01) (100% - 100%)    Parameters below as of 17 Mar 2025 15:01  Patient On (Oxygen Delivery Method): room air        PHYSICAL:    Constitutional: no acute distress   Eyes: no conjunctival infection, anicteric  ENT: pharynx is unremarkable  Neck: supple without JVD   Pulmonary: clear to auscultation bilaterally   Cardiac: RRR   Vascular: no calf tenderness, venous stasis changes   Abdomen: normoactive bowel sounds, soft and nontender   Lymphatic: no peripheral adenopathy appreciated  Musculoskeletal: full range of motion and no deformities appreciated  Skin: normal appearance, no rash  Neurology: awake, alert, oriented        LABS:    CBC Full  -  ( 17 Mar 2025 13:09 )  WBC Count : 5.34 K/uL  RBC Count : 4.18 M/uL  Hemoglobin : 12.7 g/dL  Hematocrit : 38.5 %  Platelet Count - Automated : 140 K/uL  Mean Cell Volume : 92.1 fl  Mean Cell Hemoglobin : 30.4 pg  Mean Cell Hemoglobin Concentration : 33.0 g/dL  Auto Neutrophil # : 4.36 K/uL  Auto Lymphocyte # : 0.31 K/uL  Auto Monocyte # : 0.56 K/uL  Auto Eosinophil # : 0.07 K/uL  Auto Basophil # : 0.02 K/uL  Auto Neutrophil % : 81.6 %  Auto Lymphocyte % : 5.8 %  Auto Monocyte % : 10.5 %  Auto Eosinophil % : 1.3 %  Auto Basophil % : 0.4 %    03-17    139  |  104  |  13  ----------------------------<  98  3.7   |  30  |  0.65    Ca    8.9      17 Mar 2025 13:09    TPro  6.5  /  Alb  3.4  /  TBili  0.4  /  DBili  x   /  AST  14[L]  /  ALT  20  /  AlkPhos  69  03-17    PT/INR - ( 17 Mar 2025 13:09 )   PT: 10.3 sec;   INR: 0.89 ratio         PTT - ( 17 Mar 2025 13:09 )  PTT:26.5 sec  Urinalysis Basic - ( 17 Mar 2025 13:09 )    Color: x / Appearance: x / SG: x / pH: x  Gluc: 98 mg/dL / Ketone: x  / Bili: x / Urobili: x   Blood: x / Protein: x / Nitrite: x   Leuk Esterase: x / RBC: x / WBC x   Sq Epi: x / Non Sq Epi: x / Bacteria: x        RADIOLOGY & ADDITIONAL STUDIES:      CT Angio Chest PE Protocol w/ IV Cont (03.17.25 @ 13:58)    FINDINGS:    HEART/VASCULATURE: Normal sized heart and aorta. No pericardial effusion.   New pulmonary thromboemboli within segmental and subsegmental branches of   the middle lobe.    LUNGS/AIRWAYS/PLEURA: Patent trachea and bronchi. Clear lungs. No pleural   effusion.    LYMPH NODES/MEDIASTINUM: No mediastinal lymphadenopathy. Unchanged mildly   enlarged right axillary lymph node.    UPPER ABDOMEN: Unremarkable.    BONES/SOFT TISSUES: Unremarkable.      IMPRESSION:    Acute right middle lobe segmental/subsegmental pulmonary thromboemboli.   No evidence of right heart strain.            US Duplex Venous Lower Ext Complete, Bilateral (03.17.25 @ 16:14)    FINDINGS:    RIGHT:  Normal compressibility of the RIGHT common femoral, femoral and popliteal   veins. Doppler examination shows normal spontaneous and phasic flow. No   RIGHT calf vein thrombosis is detected. Soleal vein is not visualized.    LEFT:  Normal compressibility of the LEFT common femoral, femoral and popliteal   veins. Doppler examination shows normal spontaneousand phasic flow. No   LEFT calf vein thrombosis is detected. Nonspecific left groin lymph node   measures 2.2 x 0.8 x 1.2 cm.    IMPRESSION:    No acute DVT of the right or left lower extremity, of the visualized   venous segments as above. Right soleal vein is not visualized.    Nonspecific left groin lymph node measures 2.2 x 0.8 x 1.2 cm.

## 2025-03-18 NOTE — PHARMACOTHERAPY INTERVENTION NOTE - COMMENTS
Medication reconciliation completed.  Reviewed Medication list and confirmed med allergies with patient; confirmed with Dr. First Medmary ann. 
Spoke with outpatient pharmacy, Eliquis covered by insurance for $30 and in stock. Spoke with patient at bedside and discussed indication, dosing, frequency, and side effects. Answered all questions pertaining to medications. Provided educational packet with drug information.

## 2025-03-18 NOTE — DISCHARGE NOTE PROVIDER - NSDCFUSCHEDAPPT_GEN_ALL_CORE_FT
Savi Moy  [facility]  [facility]  Scheduled Appointment: 03/21/2025    [facility]  Scheduled Appointment: 04/10/2025    [facility]  [facility]  Scheduled Appointment: 04/11/2025    Savi Moy  [facility]  [facility]  Scheduled Appointment: 04/18/2025    [facility]  [facility]  Scheduled Appointment: 05/08/2025    [facility]  [facility]  Scheduled Appointment: 05/09/2025    Savi Moy  [facility]  [facility]  Scheduled Appointment: 05/16/2025    [facility]  [facility]  Scheduled Appointment: 06/05/2025    [facility]  [facility]  Scheduled Appointment: 06/06/2025    Savi Moy  [facility]  [facility]  Scheduled Appointment: 06/13/2025

## 2025-03-18 NOTE — DISCHARGE NOTE NURSING/CASE MANAGEMENT/SOCIAL WORK - NSDCPEFALRISK_GEN_ALL_CORE
For information on Fall & Injury Prevention, visit: [email or web address] OR  [email or web address] OR  https://www.cdc.gov/steadi/patient.html

## 2025-03-18 NOTE — DISCHARGE NOTE NURSING/CASE MANAGEMENT/SOCIAL WORK - FINANCIAL ASSISTANCE
[facility] provides services at a reduced cost to those who are determined to be eligible through [facility]’s financial assistance program. Information regarding [facility]’s financial assistance program can be found by going to [email or web address] or by calling [phone].

## 2025-03-18 NOTE — DISCHARGE NOTE PROVIDER - HOSPITAL COURSE
75 y/o F with known recent dx of follicular lymphoma currently on cancer directed therapy through [facility] Hem[facility] On[facility] Dr Moy with most recent dosing 03/13, presented to the ED with ongoing SOB / CP preceded LLE calf pain / tightness. She states that the chest discomfort feels like someone sitting on top of her chest; Associated with shortness of breath. Patient denies any syncope, dizziness, n/v/d, jaw pain or left arm pain.. Pt states she started a new chemotherapy cycle on Thursday or Friday and on Saturday she had chest pressure and shortness of breath symptoms. She called her doctor (Dr. Lynch) who instructed her to come into the ED for further evaluation. Pt denies any sick contacts or recent travels. In the ER, patient was found to have PE; [facility] team was called and patient was started on heparin Drip.       # Acute PE   # Follicular Lymphoma (Grade 1, Stage III)  # Nonspecific left groin lymph node measures 2.2 x 0.8 x 1.2 cm.  - CT imaging revealing acute RML segmental & subsegmental PEs  - d/c IVF  - serial troponin and EKG  - TTE reviewed   - Doppler of LE neg for DVT  - Received Rituxan plus Bendamustine 03/13/24   - Hemeonc consult appreciated - Will need close outpatient follow up with Primary Heme Onc Dr Moy   - s/p heparin gtt, started eliquis     #VTE ppx  - d/c Heparin Drip, start eliquis

## 2025-03-18 NOTE — PROGRESS NOTE ADULT - REASON FOR ADMISSION
SOB / CP      # Acute PE in setting of Follicular Lymphoma (Grade 1, Stage III)    - 12/2024 Mammo / US breast showed unilateral R axillary LAD; s/p bx that revealed Grade 1 follicular lymphoma; cells positive for CD20, PAX5, CD10, BCL-2, BCL6, CD23, IgD; cells negative for CD30, cyclin D1, MUM1, and T cell markers  - After discussion with Heme Onc Dr Moy, determined to initiate therapy with Rituxan q weekly x4 doses then add in Bendamustine x4 doses   - She completed Rituxan only 02/20/24  - Follow up CT CAP imaging 03/11/25 revealed no new sites of neoplastic disease; slight interval decrease in size of previously seen prominent R axillary LNs; otherwise, no significant change in nonspecific prominent bilateral iliac chain and inguinal LNs  - Received Rituxan plus Bendamustine 03/13/24   - Acutely developed LLE cramping followed by SOB / CP  - Presented to the ED with CT imaging revealing acute RML segmental & subsegmental PEs  - Currently receiving Heparin gtt  - No planned inpatient cancer directed therapy  - Preferred admission to    - Will likely be transitioned to [facility] within the next 24 - 48 hours pending clinical course   - Will need close outpatient follow up with Primary Heme Onc Dr Moy

## 2025-03-21 ENCOUNTER — RESULT REVIEW (OUTPATIENT)
Age: 75
End: 2025-03-21

## 2025-03-21 ENCOUNTER — APPOINTMENT (OUTPATIENT)
Dept: HEMATOLOGY ONCOLOGY | Facility: CLINIC | Age: 75
End: 2025-03-21
Payer: COMMERCIAL

## 2025-03-21 VITALS
WEIGHT: 182.31 LBS | TEMPERATURE: 97.88 F | BODY MASS INDEX: 28.95 KG/M2 | HEART RATE: 79 BPM | DIASTOLIC BLOOD PRESSURE: 77 MMHG | OXYGEN SATURATION: 96 % | SYSTOLIC BLOOD PRESSURE: 124 MMHG | HEIGHT: 66.5 IN

## 2025-03-21 DIAGNOSIS — C82.90 FOLLICULAR LYMPHOMA, UNSPECIFIED, UNSPECIFIED SITE: ICD-10-CM

## 2025-03-21 DIAGNOSIS — I26.99 OTHER PULMONARY EMBOLISM W/OUT ACUTE COR PULMONALE: ICD-10-CM

## 2025-03-21 LAB
ALBUMIN SERPL ELPH-MCNC: 4.3 G/DL
ALP BLD-CCNC: 80 U/L
ALT SERPL-CCNC: 19 U/L
ANION GAP SERPL CALC-SCNC: 11 MMOL/L
AST SERPL-CCNC: 24 U/L
BASOPHILS # BLD AUTO: 0.02 K/UL — SIGNIFICANT CHANGE UP (ref 0–0.2)
BASOPHILS NFR BLD AUTO: 0.3 % — SIGNIFICANT CHANGE UP (ref 0–2)
BILIRUB SERPL-MCNC: 0.3 MG/DL
BUN SERPL-MCNC: 16 MG/DL
CALCIUM SERPL-MCNC: 9.3 MG/DL
CHLORIDE SERPL-SCNC: 101 MMOL/L
CO2 SERPL-SCNC: 29 MMOL/L
CREAT SERPL-MCNC: 0.78 MG/DL
EGFRCR SERPLBLD CKD-EPI 2021: 81 ML/MIN/1.73M2
EOSINOPHIL # BLD AUTO: 0.08 K/UL — SIGNIFICANT CHANGE UP (ref 0–0.5)
EOSINOPHIL NFR BLD AUTO: 1.3 % — SIGNIFICANT CHANGE UP (ref 0–6)
GLUCOSE SERPL-MCNC: 110 MG/DL
HCT VFR BLD CALC: 40.2 % — SIGNIFICANT CHANGE UP (ref 34.5–45)
HGB BLD-MCNC: 13.6 G/DL — SIGNIFICANT CHANGE UP (ref 11.5–15.5)
IMM GRANULOCYTES NFR BLD AUTO: 0.7 % — SIGNIFICANT CHANGE UP (ref 0–0.9)
LYMPHOCYTES # BLD AUTO: 0.19 K/UL — LOW (ref 1–3.3)
LYMPHOCYTES # BLD AUTO: 3.2 % — LOW (ref 13–44)
MCHC RBC-ENTMCNC: 30.8 PG — SIGNIFICANT CHANGE UP (ref 27–34)
MCHC RBC-ENTMCNC: 33.8 G/DL — SIGNIFICANT CHANGE UP (ref 32–36)
MCV RBC AUTO: 91 FL — SIGNIFICANT CHANGE UP (ref 80–100)
MONOCYTES # BLD AUTO: 0.85 K/UL — SIGNIFICANT CHANGE UP (ref 0–0.9)
MONOCYTES NFR BLD AUTO: 14.2 % — HIGH (ref 2–14)
NEUTROPHILS # BLD AUTO: 4.82 K/UL — SIGNIFICANT CHANGE UP (ref 1.8–7.4)
NEUTROPHILS NFR BLD AUTO: 80.3 % — HIGH (ref 43–77)
NRBC BLD AUTO-RTO: 0 /100 WBCS — SIGNIFICANT CHANGE UP (ref 0–0)
PLATELET # BLD AUTO: 141 K/UL — LOW (ref 150–400)
POTASSIUM SERPL-SCNC: 4.3 MMOL/L
PROT SERPL-MCNC: 6.3 G/DL
RBC # BLD: 4.42 M/UL — SIGNIFICANT CHANGE UP (ref 3.8–5.2)
RBC # FLD: 12.9 % — SIGNIFICANT CHANGE UP (ref 10.3–14.5)
SODIUM SERPL-SCNC: 141 MMOL/L
WBC # BLD: 6 K/UL — SIGNIFICANT CHANGE UP (ref 3.8–10.5)
WBC # FLD AUTO: 6 K/UL — SIGNIFICANT CHANGE UP (ref 3.8–10.5)

## 2025-03-21 PROCEDURE — 99215 OFFICE O/P EST HI 40 MIN: CPT

## 2025-03-22 LAB — LDH SERPL-CCNC: 183 U/L

## 2025-03-23 PROBLEM — I26.99 OTHER ACUTE PULMONARY EMBOLISM WITHOUT ACUTE COR PULMONALE: Status: ACTIVE | Noted: 2025-03-23

## 2025-03-24 DIAGNOSIS — I26.99 OTHER PULMONARY EMBOLISM WITHOUT ACUTE COR PULMONALE: ICD-10-CM

## 2025-03-24 DIAGNOSIS — Z92.21 PERSONAL HISTORY OF ANTINEOPLASTIC CHEMOTHERAPY: ICD-10-CM

## 2025-03-24 DIAGNOSIS — C50.911 MALIGNANT NEOPLASM OF UNSPECIFIED SITE OF RIGHT FEMALE BREAST: ICD-10-CM

## 2025-04-09 RX ORDER — APIXABAN 5 MG/1
5 TABLET, FILM COATED ORAL
Qty: 60 | Refills: 2 | Status: ACTIVE | COMMUNITY
Start: 2025-04-09 | End: 1900-01-01

## 2025-04-10 ENCOUNTER — RESULT REVIEW (OUTPATIENT)
Age: 75
End: 2025-04-10

## 2025-04-10 ENCOUNTER — APPOINTMENT (OUTPATIENT)
Dept: INFUSION THERAPY | Facility: CLINIC | Age: 75
End: 2025-04-10

## 2025-04-10 DIAGNOSIS — R11.2 NAUSEA WITH VOMITING, UNSPECIFIED: ICD-10-CM

## 2025-04-10 DIAGNOSIS — Z51.11 ENCOUNTER FOR ANTINEOPLASTIC CHEMOTHERAPY: ICD-10-CM

## 2025-04-10 LAB
BASOPHILS # BLD AUTO: 0.03 K/UL — SIGNIFICANT CHANGE UP (ref 0–0.2)
BASOPHILS NFR BLD AUTO: 0.7 % — SIGNIFICANT CHANGE UP (ref 0–2)
EOSINOPHIL # BLD AUTO: 0.08 K/UL — SIGNIFICANT CHANGE UP (ref 0–0.5)
EOSINOPHIL NFR BLD AUTO: 1.9 % — SIGNIFICANT CHANGE UP (ref 0–6)
HCT VFR BLD CALC: 38.1 % — SIGNIFICANT CHANGE UP (ref 34.5–45)
HGB BLD-MCNC: 13.3 G/DL — SIGNIFICANT CHANGE UP (ref 11.5–15.5)
IMM GRANULOCYTES NFR BLD AUTO: 0.2 % — SIGNIFICANT CHANGE UP (ref 0–0.9)
LYMPHOCYTES # BLD AUTO: 0.33 K/UL — LOW (ref 1–3.3)
LYMPHOCYTES # BLD AUTO: 8 % — LOW (ref 13–44)
MCHC RBC-ENTMCNC: 31.1 PG — SIGNIFICANT CHANGE UP (ref 27–34)
MCHC RBC-ENTMCNC: 34.9 G/DL — SIGNIFICANT CHANGE UP (ref 32–36)
MCV RBC AUTO: 89 FL — SIGNIFICANT CHANGE UP (ref 80–100)
MONOCYTES # BLD AUTO: 0.58 K/UL — SIGNIFICANT CHANGE UP (ref 0–0.9)
MONOCYTES NFR BLD AUTO: 14 % — SIGNIFICANT CHANGE UP (ref 2–14)
NEUTROPHILS # BLD AUTO: 3.11 K/UL — SIGNIFICANT CHANGE UP (ref 1.8–7.4)
NEUTROPHILS NFR BLD AUTO: 75.2 % — SIGNIFICANT CHANGE UP (ref 43–77)
NRBC BLD AUTO-RTO: 0 /100 WBCS — SIGNIFICANT CHANGE UP (ref 0–0)
PLATELET # BLD AUTO: 142 K/UL — LOW (ref 150–400)
RBC # BLD: 4.28 M/UL — SIGNIFICANT CHANGE UP (ref 3.8–5.2)
RBC # FLD: 12.6 % — SIGNIFICANT CHANGE UP (ref 10.3–14.5)
WBC # BLD: 4.14 K/UL — SIGNIFICANT CHANGE UP (ref 3.8–10.5)
WBC # FLD AUTO: 4.14 K/UL — SIGNIFICANT CHANGE UP (ref 3.8–10.5)

## 2025-04-11 ENCOUNTER — APPOINTMENT (OUTPATIENT)
Dept: INFUSION THERAPY | Facility: CLINIC | Age: 75
End: 2025-04-11

## 2025-04-11 VITALS
HEART RATE: 71 BPM | SYSTOLIC BLOOD PRESSURE: 131 MMHG | WEIGHT: 185 LBS | TEMPERATURE: 208.58 F | HEIGHT: 66.5 IN | OXYGEN SATURATION: 97 % | BODY MASS INDEX: 29.38 KG/M2 | DIASTOLIC BLOOD PRESSURE: 72 MMHG

## 2025-04-11 RX ORDER — ESTRADIOL 25 MCG
1 TABLET VAGINAL
Refills: 0 | DISCHARGE

## 2025-04-11 RX ORDER — RITUXIMAB-PVVR 100 MG/10ML
0 INJECTION, SOLUTION INTRAVENOUS
Refills: 0 | DISCHARGE

## 2025-04-11 RX ORDER — DOCUSATE SODIUM 100 MG
1 CAPSULE ORAL
Refills: 0 | DISCHARGE

## 2025-04-11 RX ORDER — BENDAMUSTINE HYDROCHLORIDE 100 MG/20ML
1 INJECTION, POWDER, LYOPHILIZED, FOR SOLUTION INTRAVENOUS
Refills: 0 | DISCHARGE

## 2025-04-14 ENCOUNTER — NON-APPOINTMENT (OUTPATIENT)
Age: 75
End: 2025-04-14

## 2025-04-18 ENCOUNTER — RESULT REVIEW (OUTPATIENT)
Age: 75
End: 2025-04-18

## 2025-04-18 ENCOUNTER — APPOINTMENT (OUTPATIENT)
Dept: HEMATOLOGY ONCOLOGY | Facility: CLINIC | Age: 75
End: 2025-04-18

## 2025-04-18 VITALS
BODY MASS INDEX: 28.9 KG/M2 | HEIGHT: 66.5 IN | OXYGEN SATURATION: 96 % | HEART RATE: 83 BPM | TEMPERATURE: 208.94 F | DIASTOLIC BLOOD PRESSURE: 69 MMHG | SYSTOLIC BLOOD PRESSURE: 113 MMHG | WEIGHT: 182 LBS

## 2025-04-18 DIAGNOSIS — K64.9 UNSPECIFIED HEMORRHOIDS: ICD-10-CM

## 2025-04-18 LAB
ALBUMIN SERPL ELPH-MCNC: 4 G/DL
ALP BLD-CCNC: 68 U/L
ALT SERPL-CCNC: 13 U/L
ANION GAP SERPL CALC-SCNC: 9 MMOL/L
AST SERPL-CCNC: 15 U/L
BASOPHILS # BLD AUTO: 0.03 K/UL — SIGNIFICANT CHANGE UP (ref 0–0.2)
BASOPHILS NFR BLD AUTO: 0.6 % — SIGNIFICANT CHANGE UP (ref 0–2)
BILIRUB SERPL-MCNC: 0.2 MG/DL
BUN SERPL-MCNC: 12 MG/DL
CALCIUM SERPL-MCNC: 8.4 MG/DL
CHLORIDE SERPL-SCNC: 103 MMOL/L
CO2 SERPL-SCNC: 29 MMOL/L
CREAT SERPL-MCNC: 0.67 MG/DL
EGFRCR SERPLBLD CKD-EPI 2021: 93 ML/MIN/1.73M2
EOSINOPHIL # BLD AUTO: 0.08 K/UL — SIGNIFICANT CHANGE UP (ref 0–0.5)
EOSINOPHIL NFR BLD AUTO: 1.7 % — SIGNIFICANT CHANGE UP (ref 0–6)
GLUCOSE SERPL-MCNC: 113 MG/DL
HCT VFR BLD CALC: 37.1 % — SIGNIFICANT CHANGE UP (ref 34.5–45)
HGB BLD-MCNC: 12.6 G/DL — SIGNIFICANT CHANGE UP (ref 11.5–15.5)
IMM GRANULOCYTES NFR BLD AUTO: 0.6 % — SIGNIFICANT CHANGE UP (ref 0–0.9)
LDH SERPL-CCNC: 166 U/L
LYMPHOCYTES # BLD AUTO: 0.11 K/UL — LOW (ref 1–3.3)
LYMPHOCYTES # BLD AUTO: 2.3 % — LOW (ref 13–44)
MCHC RBC-ENTMCNC: 31.2 PG — SIGNIFICANT CHANGE UP (ref 27–34)
MCHC RBC-ENTMCNC: 34 G/DL — SIGNIFICANT CHANGE UP (ref 32–36)
MCV RBC AUTO: 91.8 FL — SIGNIFICANT CHANGE UP (ref 80–100)
MONOCYTES # BLD AUTO: 0.58 K/UL — SIGNIFICANT CHANGE UP (ref 0–0.9)
MONOCYTES NFR BLD AUTO: 12.4 % — SIGNIFICANT CHANGE UP (ref 2–14)
NEUTROPHILS # BLD AUTO: 3.86 K/UL — SIGNIFICANT CHANGE UP (ref 1.8–7.4)
NEUTROPHILS NFR BLD AUTO: 82.4 % — HIGH (ref 43–77)
NRBC BLD AUTO-RTO: 0 /100 WBCS — SIGNIFICANT CHANGE UP (ref 0–0)
PLATELET # BLD AUTO: 126 K/UL — LOW (ref 150–400)
POTASSIUM SERPL-SCNC: 3.9 MMOL/L
PROT SERPL-MCNC: 5.8 G/DL
RBC # BLD: 4.04 M/UL — SIGNIFICANT CHANGE UP (ref 3.8–5.2)
RBC # FLD: 12.7 % — SIGNIFICANT CHANGE UP (ref 10.3–14.5)
SODIUM SERPL-SCNC: 141 MMOL/L
WBC # BLD: 4.69 K/UL — SIGNIFICANT CHANGE UP (ref 3.8–10.5)
WBC # FLD AUTO: 4.69 K/UL — SIGNIFICANT CHANGE UP (ref 3.8–10.5)

## 2025-04-18 PROCEDURE — 99214 OFFICE O/P EST MOD 30 MIN: CPT

## 2025-04-18 RX ORDER — NYSTATIN 100000 [USP'U]/G
100000 CREAM TOPICAL TWICE DAILY
Qty: 1 | Refills: 1 | Status: ACTIVE | COMMUNITY
Start: 2025-04-18 | End: 1900-01-01

## 2025-04-25 NOTE — ED ADULT NURSE NOTE - IN ACCORDANCE WITH [place] LAW, WE OFFER EVERY PATIENT A HEPATITIS C TEST. WOULD YOU LIKE TO BE TESTED TODAY?
HF Patient Discharge Instructions  Monitor your weight daily, and maintain a weight chart, to track your weight changes.   Activity as tolerated, unless your Doctor has ordered otherwise.   Follow a low fat, low cholesterol, low salt diet unless instructed otherwise by your Doctor. Read the labels on the back of food products and track your intake of fat, cholesterol and salt.   Fluid Restriction No. If a Fluid Restriction has been ordered by your Doctor, measure fluids with a measuring cup to ensure that you are not exceeding the restriction.   No smoking.  Oxygen No. If your Doctor has ordered that you wear Oxygen at home, it is important to wear it as ordered.  Did you receive an explanation from staff on the importance of taking each of your medications and why it is necessary to keep taking them unless your doctor says to stop? Yes  Were all of your questions answered about how to manage your heart failure and what to do if you have increased signs and symptoms after you go home? Yes  Do you feel like your heart failure care team involved you in the care treatment plan and allowed you to make decisions regarding your care while in the hospital and addressed any discharge needs you might have? Yes    See the educational handout provided at discharge for more information on monitoring your daily weight, activity and diet. This also explains more about Heart Failure, symptoms of a flare-up and some of the tests that you have undergone.     Warning Signs of a Flare-Up include:  Swelling in the ankles or lower legs.  Shortness of breath, while at rest, or while doing normal activities.   Shortness of breath at night when in bed, or coughing in bed.   Requiring more pillows to sleep at night, or needing to sit up at night to sleep.  Feeling weak, dizzy or fatigued.     When to call your Doctor:  Call your Primary Care Physician or Cardiologist if:   You experience any pain radiating to your jaw or neck.  You have  2 RN's discuss fall risk and fall risk precautions with pt, pt is A+O, he verbalizes understanding, no questions. Continues to declines bed alarms. Bed alarms off at this time per pt request.      any difficulty breathing.  You experience weight gain of 3 lbs in a day or 5 lbs in a week.   You feel any palpitations or irregular heartbeats.  You become dizzy or lose consciousness.   If you have had an angiogram or had a pacemaker or AICD placed, and experience:  Bleeding, drainage or swelling at the surgical / puncture site.  Fever greater than 100.0 F  Shock from internal defibrillator.  Cool and / or numb extremities.     Please access the [facility] My HF Guide/Heart Failure Interactive Workbook:   [email or web address]     Opt out

## 2025-05-08 ENCOUNTER — APPOINTMENT (OUTPATIENT)
Dept: INFUSION THERAPY | Facility: CLINIC | Age: 75
End: 2025-05-08

## 2025-05-08 ENCOUNTER — RESULT REVIEW (OUTPATIENT)
Age: 75
End: 2025-05-08

## 2025-05-08 VITALS
OXYGEN SATURATION: 96 % | WEIGHT: 182 LBS | HEART RATE: 88 BPM | TEMPERATURE: 209.48 F | DIASTOLIC BLOOD PRESSURE: 62 MMHG | SYSTOLIC BLOOD PRESSURE: 108 MMHG | BODY MASS INDEX: 28.94 KG/M2

## 2025-05-08 VITALS — TEMPERATURE: 98.6 F

## 2025-05-08 LAB
BASOPHILS # BLD AUTO: 0.03 K/UL — SIGNIFICANT CHANGE UP (ref 0–0.2)
BASOPHILS NFR BLD AUTO: 0.9 % — SIGNIFICANT CHANGE UP (ref 0–2)
EOSINOPHIL # BLD AUTO: 0.08 K/UL — SIGNIFICANT CHANGE UP (ref 0–0.5)
EOSINOPHIL NFR BLD AUTO: 2.3 % — SIGNIFICANT CHANGE UP (ref 0–6)
HCT VFR BLD CALC: 36.8 % — SIGNIFICANT CHANGE UP (ref 34.5–45)
HGB BLD-MCNC: 12.8 G/DL — SIGNIFICANT CHANGE UP (ref 11.5–15.5)
IMM GRANULOCYTES NFR BLD AUTO: 0.3 % — SIGNIFICANT CHANGE UP (ref 0–0.9)
LYMPHOCYTES # BLD AUTO: 0.17 K/UL — LOW (ref 1–3.3)
LYMPHOCYTES # BLD AUTO: 4.8 % — LOW (ref 13–44)
MCHC RBC-ENTMCNC: 31.4 PG — SIGNIFICANT CHANGE UP (ref 27–34)
MCHC RBC-ENTMCNC: 34.8 G/DL — SIGNIFICANT CHANGE UP (ref 32–36)
MCV RBC AUTO: 90.2 FL — SIGNIFICANT CHANGE UP (ref 80–100)
MONOCYTES # BLD AUTO: 0.45 K/UL — SIGNIFICANT CHANGE UP (ref 0–0.9)
MONOCYTES NFR BLD AUTO: 12.8 % — SIGNIFICANT CHANGE UP (ref 2–14)
NEUTROPHILS # BLD AUTO: 2.77 K/UL — SIGNIFICANT CHANGE UP (ref 1.8–7.4)
NEUTROPHILS NFR BLD AUTO: 78.9 % — HIGH (ref 43–77)
NRBC BLD AUTO-RTO: 0 /100 WBCS — SIGNIFICANT CHANGE UP (ref 0–0)
PLATELET # BLD AUTO: 172 K/UL — SIGNIFICANT CHANGE UP (ref 150–400)
RBC # BLD: 4.08 M/UL — SIGNIFICANT CHANGE UP (ref 3.8–5.2)
RBC # FLD: 12.5 % — SIGNIFICANT CHANGE UP (ref 10.3–14.5)
WBC # BLD: 3.51 K/UL — LOW (ref 3.8–10.5)
WBC # FLD AUTO: 3.51 K/UL — LOW (ref 3.8–10.5)

## 2025-05-09 ENCOUNTER — APPOINTMENT (OUTPATIENT)
Dept: INFUSION THERAPY | Facility: CLINIC | Age: 75
End: 2025-05-09

## 2025-05-16 ENCOUNTER — RESULT REVIEW (OUTPATIENT)
Age: 75
End: 2025-05-16

## 2025-05-16 ENCOUNTER — APPOINTMENT (OUTPATIENT)
Dept: HEMATOLOGY ONCOLOGY | Facility: CLINIC | Age: 75
End: 2025-05-16
Payer: COMMERCIAL

## 2025-05-16 VITALS
TEMPERATURE: 98.6 F | HEART RATE: 71 BPM | BODY MASS INDEX: 28.75 KG/M2 | SYSTOLIC BLOOD PRESSURE: 114 MMHG | OXYGEN SATURATION: 96 % | DIASTOLIC BLOOD PRESSURE: 74 MMHG | HEIGHT: 66.5 IN | WEIGHT: 181 LBS

## 2025-05-16 DIAGNOSIS — I26.99 OTHER PULMONARY EMBOLISM W/OUT ACUTE COR PULMONALE: ICD-10-CM

## 2025-05-16 DIAGNOSIS — C82.90 FOLLICULAR LYMPHOMA, UNSPECIFIED, UNSPECIFIED SITE: ICD-10-CM

## 2025-05-16 LAB
BASOPHILS # BLD AUTO: 0.02 K/UL — SIGNIFICANT CHANGE UP (ref 0–0.2)
BASOPHILS NFR BLD AUTO: 0.5 % — SIGNIFICANT CHANGE UP (ref 0–2)
EOSINOPHIL # BLD AUTO: 0.07 K/UL — SIGNIFICANT CHANGE UP (ref 0–0.5)
EOSINOPHIL NFR BLD AUTO: 1.6 % — SIGNIFICANT CHANGE UP (ref 0–6)
HCT VFR BLD CALC: 36.8 % — SIGNIFICANT CHANGE UP (ref 34.5–45)
HGB BLD-MCNC: 12.7 G/DL — SIGNIFICANT CHANGE UP (ref 11.5–15.5)
IMM GRANULOCYTES NFR BLD AUTO: 0.5 % — SIGNIFICANT CHANGE UP (ref 0–0.9)
LYMPHOCYTES # BLD AUTO: 0.08 K/UL — LOW (ref 1–3.3)
LYMPHOCYTES # BLD AUTO: 1.8 % — LOW (ref 13–44)
MCHC RBC-ENTMCNC: 31.3 PG — SIGNIFICANT CHANGE UP (ref 27–34)
MCHC RBC-ENTMCNC: 34.5 G/DL — SIGNIFICANT CHANGE UP (ref 32–36)
MCV RBC AUTO: 90.6 FL — SIGNIFICANT CHANGE UP (ref 80–100)
MONOCYTES # BLD AUTO: 0.61 K/UL — SIGNIFICANT CHANGE UP (ref 0–0.9)
MONOCYTES NFR BLD AUTO: 14 % — SIGNIFICANT CHANGE UP (ref 2–14)
NEUTROPHILS # BLD AUTO: 3.55 K/UL — SIGNIFICANT CHANGE UP (ref 1.8–7.4)
NEUTROPHILS NFR BLD AUTO: 81.6 % — HIGH (ref 43–77)
NRBC BLD AUTO-RTO: 0 /100 WBCS — SIGNIFICANT CHANGE UP (ref 0–0)
PLATELET # BLD AUTO: 142 K/UL — LOW (ref 150–400)
RBC # BLD: 4.06 M/UL — SIGNIFICANT CHANGE UP (ref 3.8–5.2)
RBC # FLD: 12.9 % — SIGNIFICANT CHANGE UP (ref 10.3–14.5)
WBC # BLD: 4.35 K/UL — SIGNIFICANT CHANGE UP (ref 3.8–10.5)
WBC # FLD AUTO: 4.35 K/UL — SIGNIFICANT CHANGE UP (ref 3.8–10.5)

## 2025-05-16 PROCEDURE — 99214 OFFICE O/P EST MOD 30 MIN: CPT

## 2025-05-17 LAB
ALBUMIN SERPL ELPH-MCNC: 4 G/DL
ALP BLD-CCNC: 73 U/L
ALT SERPL-CCNC: 17 U/L
ANION GAP SERPL CALC-SCNC: 11 MMOL/L
AST SERPL-CCNC: 19 U/L
BILIRUB SERPL-MCNC: 0.2 MG/DL
BUN SERPL-MCNC: 15 MG/DL
CALCIUM SERPL-MCNC: 9.2 MG/DL
CHLORIDE SERPL-SCNC: 103 MMOL/L
CO2 SERPL-SCNC: 26 MMOL/L
CREAT SERPL-MCNC: 0.78 MG/DL
EGFRCR SERPLBLD CKD-EPI 2021: 81 ML/MIN/1.73M2
GLUCOSE SERPL-MCNC: 89 MG/DL
LDH SERPL-CCNC: 160 U/L
POTASSIUM SERPL-SCNC: 4.2 MMOL/L
PROT SERPL-MCNC: 6.1 G/DL
SODIUM SERPL-SCNC: 141 MMOL/L

## 2025-06-02 ENCOUNTER — OUTPATIENT (OUTPATIENT)
Dept: OUTPATIENT SERVICES | Facility: HOSPITAL | Age: 75
LOS: 1 days | Discharge: HOME | End: 2025-06-02

## 2025-06-02 DIAGNOSIS — I26.99 OTHER PULMONARY EMBOLISM WITHOUT ACUTE COR PULMONALE: ICD-10-CM

## 2025-06-05 ENCOUNTER — APPOINTMENT (OUTPATIENT)
Dept: INFUSION THERAPY | Facility: CLINIC | Age: 75
End: 2025-06-05

## 2025-06-05 ENCOUNTER — RESULT REVIEW (OUTPATIENT)
Age: 75
End: 2025-06-05

## 2025-06-05 VITALS
OXYGEN SATURATION: 94 % | HEART RATE: 78 BPM | TEMPERATURE: 98.2 F | WEIGHT: 183 LBS | DIASTOLIC BLOOD PRESSURE: 65 MMHG | SYSTOLIC BLOOD PRESSURE: 110 MMHG | BODY MASS INDEX: 29.06 KG/M2 | HEIGHT: 66.5 IN

## 2025-06-05 DIAGNOSIS — R11.2 NAUSEA WITH VOMITING, UNSPECIFIED: ICD-10-CM

## 2025-06-05 DIAGNOSIS — Z51.11 ENCOUNTER FOR ANTINEOPLASTIC CHEMOTHERAPY: ICD-10-CM

## 2025-06-05 LAB
BASOPHILS # BLD AUTO: 0.04 K/UL — SIGNIFICANT CHANGE UP (ref 0–0.2)
BASOPHILS NFR BLD AUTO: 1.2 % — SIGNIFICANT CHANGE UP (ref 0–2)
EOSINOPHIL # BLD AUTO: 0.07 K/UL — SIGNIFICANT CHANGE UP (ref 0–0.5)
EOSINOPHIL NFR BLD AUTO: 2.1 % — SIGNIFICANT CHANGE UP (ref 0–6)
HCT VFR BLD CALC: 34.9 % — SIGNIFICANT CHANGE UP (ref 34.5–45)
HGB BLD-MCNC: 12 G/DL — SIGNIFICANT CHANGE UP (ref 11.5–15.5)
IMM GRANULOCYTES NFR BLD AUTO: 0.3 % — SIGNIFICANT CHANGE UP (ref 0–0.9)
LYMPHOCYTES # BLD AUTO: 0.19 K/UL — LOW (ref 1–3.3)
LYMPHOCYTES # BLD AUTO: 5.7 % — LOW (ref 13–44)
MCHC RBC-ENTMCNC: 31.3 PG — SIGNIFICANT CHANGE UP (ref 27–34)
MCHC RBC-ENTMCNC: 34.4 G/DL — SIGNIFICANT CHANGE UP (ref 32–36)
MCV RBC AUTO: 90.9 FL — SIGNIFICANT CHANGE UP (ref 80–100)
MONOCYTES # BLD AUTO: 0.56 K/UL — SIGNIFICANT CHANGE UP (ref 0–0.9)
MONOCYTES NFR BLD AUTO: 16.9 % — HIGH (ref 2–14)
NEUTROPHILS # BLD AUTO: 2.44 K/UL — SIGNIFICANT CHANGE UP (ref 1.8–7.4)
NEUTROPHILS NFR BLD AUTO: 73.8 % — SIGNIFICANT CHANGE UP (ref 43–77)
NRBC BLD AUTO-RTO: 0 /100 WBCS — SIGNIFICANT CHANGE UP (ref 0–0)
PLATELET # BLD AUTO: 161 K/UL — SIGNIFICANT CHANGE UP (ref 150–400)
RBC # BLD: 3.84 M/UL — SIGNIFICANT CHANGE UP (ref 3.8–5.2)
RBC # FLD: 13 % — SIGNIFICANT CHANGE UP (ref 10.3–14.5)
WBC # BLD: 3.31 K/UL — LOW (ref 3.8–10.5)
WBC # FLD AUTO: 3.31 K/UL — LOW (ref 3.8–10.5)

## 2025-06-06 ENCOUNTER — NON-APPOINTMENT (OUTPATIENT)
Age: 75
End: 2025-06-06

## 2025-06-06 ENCOUNTER — APPOINTMENT (OUTPATIENT)
Dept: INFUSION THERAPY | Facility: CLINIC | Age: 75
End: 2025-06-06

## 2025-06-06 VITALS — BODY MASS INDEX: 29.73 KG/M2 | WEIGHT: 187 LBS

## 2025-06-09 RX ORDER — FLUTICASONE PROPIONATE 110 UG/1
110 AEROSOL, METERED RESPIRATORY (INHALATION) TWICE DAILY
Qty: 1 | Refills: 0 | Status: ACTIVE | COMMUNITY
Start: 2025-06-09 | End: 1900-01-01

## 2025-06-13 ENCOUNTER — APPOINTMENT (OUTPATIENT)
Dept: HEMATOLOGY ONCOLOGY | Facility: CLINIC | Age: 75
End: 2025-06-13
Payer: COMMERCIAL

## 2025-06-13 ENCOUNTER — RESULT REVIEW (OUTPATIENT)
Age: 75
End: 2025-06-13

## 2025-06-13 VITALS
OXYGEN SATURATION: 100 % | WEIGHT: 184 LBS | TEMPERATURE: 97 F | SYSTOLIC BLOOD PRESSURE: 103 MMHG | DIASTOLIC BLOOD PRESSURE: 64 MMHG | HEART RATE: 83 BPM | BODY MASS INDEX: 29.25 KG/M2

## 2025-06-13 LAB
ALBUMIN SERPL ELPH-MCNC: 4 G/DL
ALP BLD-CCNC: 64 U/L
ALT SERPL-CCNC: 14 U/L
ANION GAP SERPL CALC-SCNC: 12 MMOL/L
AST SERPL-CCNC: 19 U/L
BASOPHILS # BLD AUTO: 0.03 K/UL — SIGNIFICANT CHANGE UP (ref 0–0.2)
BASOPHILS NFR BLD AUTO: 1 % — SIGNIFICANT CHANGE UP (ref 0–2)
BILIRUB SERPL-MCNC: 0.4 MG/DL
BUN SERPL-MCNC: 17 MG/DL
CALCIUM SERPL-MCNC: 9.2 MG/DL
CHLORIDE SERPL-SCNC: 106 MMOL/L
CO2 SERPL-SCNC: 24 MMOL/L
CREAT SERPL-MCNC: 0.85 MG/DL
EGFRCR SERPLBLD CKD-EPI 2021: 73 ML/MIN/1.73M2
ELLIPTOCYTES BLD QL SMEAR: SLIGHT — SIGNIFICANT CHANGE UP
EOSINOPHIL # BLD AUTO: 0.09 K/UL — SIGNIFICANT CHANGE UP (ref 0–0.5)
EOSINOPHIL NFR BLD AUTO: 2.9 % — SIGNIFICANT CHANGE UP (ref 0–6)
GIANT PLATELETS BLD QL SMEAR: PRESENT — SIGNIFICANT CHANGE UP
GLUCOSE SERPL-MCNC: 83 MG/DL
HCT VFR BLD CALC: 34.8 % — SIGNIFICANT CHANGE UP (ref 34.5–45)
HGB BLD-MCNC: 12.1 G/DL — SIGNIFICANT CHANGE UP (ref 11.5–15.5)
IMM GRANULOCYTES NFR BLD AUTO: 0.6 % — SIGNIFICANT CHANGE UP (ref 0–0.9)
LDH SERPL-CCNC: 185 U/L
LG PLATELETS BLD QL AUTO: SLIGHT — SIGNIFICANT CHANGE UP
LYMPHOCYTES # BLD AUTO: 0.06 K/UL — LOW (ref 1–3.3)
LYMPHOCYTES # BLD AUTO: 1.9 % — LOW (ref 13–44)
MACROCYTES BLD QL: SLIGHT — SIGNIFICANT CHANGE UP
MCHC RBC-ENTMCNC: 31.3 PG — SIGNIFICANT CHANGE UP (ref 27–34)
MCHC RBC-ENTMCNC: 34.8 G/DL — SIGNIFICANT CHANGE UP (ref 32–36)
MCV RBC AUTO: 90.2 FL — SIGNIFICANT CHANGE UP (ref 80–100)
MONOCYTES # BLD AUTO: 0.41 K/UL — SIGNIFICANT CHANGE UP (ref 0–0.9)
MONOCYTES NFR BLD AUTO: 13.2 % — SIGNIFICANT CHANGE UP (ref 2–14)
NEUTROPHILS # BLD AUTO: 2.5 K/UL — SIGNIFICANT CHANGE UP (ref 1.8–7.4)
NEUTROPHILS NFR BLD AUTO: 80.4 % — HIGH (ref 43–77)
NRBC BLD AUTO-RTO: 0 /100 WBCS — SIGNIFICANT CHANGE UP (ref 0–0)
PLAT MORPH BLD: NORMAL — SIGNIFICANT CHANGE UP
PLATELET # BLD AUTO: 137 K/UL — LOW (ref 150–400)
POLYCHROMASIA BLD QL SMEAR: SLIGHT — SIGNIFICANT CHANGE UP
POTASSIUM SERPL-SCNC: 4.4 MMOL/L
PROT SERPL-MCNC: 5.8 G/DL
RBC # BLD: 3.86 M/UL — SIGNIFICANT CHANGE UP (ref 3.8–5.2)
RBC # FLD: 13.2 % — SIGNIFICANT CHANGE UP (ref 10.3–14.5)
RBC BLD AUTO: NORMAL — SIGNIFICANT CHANGE UP
SODIUM SERPL-SCNC: 141 MMOL/L
WBC # BLD: 3.11 K/UL — LOW (ref 3.8–10.5)
WBC # FLD AUTO: 3.11 K/UL — LOW (ref 3.8–10.5)

## 2025-06-13 PROCEDURE — 99214 OFFICE O/P EST MOD 30 MIN: CPT

## 2025-06-30 RX ORDER — DOXYCYCLINE HYCLATE 100 MG/1
100 CAPSULE ORAL
Qty: 2 | Refills: 0 | Status: ACTIVE | COMMUNITY
Start: 2025-06-30 | End: 1900-01-01

## 2025-07-01 ENCOUNTER — RX RENEWAL (OUTPATIENT)
Age: 75
End: 2025-07-01

## 2025-07-05 ENCOUNTER — NON-APPOINTMENT (OUTPATIENT)
Age: 75
End: 2025-07-05

## 2025-07-06 NOTE — PATIENT PROFILE ADULT - ARE YOU PART OF A CLINICAL TRIAL?
Hematology/Oncology Progress Note        Subjective  Patient is feeling better.   She is more balanced about the diagnosis especially after news that there is no obvious metastatic disease outside of intra-abdominal process    She is ready for surgery tomorrow      Medications  Current Facility-Administered Medications   Medication Dose Route Frequency Provider Last Rate Last Admin    magnesium sulfate 4 g in sterile water IVPB (Ordered as: magnesium sulfate)  4 g Intravenous Once Alison Nieto MD        magnesium citrate (CITROMA) solution 300 mL  300 mL Oral Once Abiel Osborne MD        iron sucrose (VENOFER) injection 200 mg  200 mg Intravenous Daily Dariel Solis MD   200 mg at 07/06/25 0834    scopolamine (TRANSDERM_SCOP) 1 MG/3DAYS patch 1 patch  1 patch Transdermal Q3 Days Alison Nieto MD   1 patch at 07/05/25 1019    cefepime (MAXIPIME) 1,000 mg in sodium chloride 0.9 % 100 mL IVPB  1,000 mg Intravenous Q8H Mityeseniai, Joelleynat S, DO   Stopped at 07/06/25 0925    metroNIDAZOLE (FLAGYL) premix IVPB 500 mg  500 mg Intravenous Q8H Jose Robertohani, Qaynat S, DO   Stopped at 07/06/25 0830    HYDROcodone-acetaminophen (NORCO) 5-325 MG per tablet 1 tablet  1 tablet Oral Q4H PRN Jose Alejandroi, Qaynat S, DO        morphine injection 2 mg  2 mg Intravenous Q4H PRN Jose Alejandroi, Qaynat S, DO   2 mg at 07/06/25 0000    acetaminophen (TYLENOL) tablet 650 mg  650 mg Oral Q4H PRN Jose Alejandroi, Joelleynat S, DO   650 mg at 07/06/25 0748    Or    acetaminophen (TYLENOL) suppository 650 mg  650 mg Rectal Q4H PRN Mithani, Qaynat S, DO        bisacodyl (DULCOLAX) suppository 10 mg  10 mg Rectal Daily PRN Jose Alejandroi, Qaynat S, DO        magnesium hydroxide (MILK OF MAGNESIA) 400 MG/5ML suspension 30 mL  30 mL Oral Daily PRN Jose Alejandroi, Qaynat S, DO        melatonin tablet 3 mg  3 mg Oral Nightly PRN Mityeseniai, Joelleynat S, DO        Potassium Standard Replacement Protocol (Levels 3.5 and lower)   Does not apply See Admin Instructions Jose Alejandroi, Joelleynat S, DO         Magnesium Standard Replacement Protocol   Does not apply See Admin Instructions Mityeseniai, Qaynat S, DO        sodium chloride 0.9 % injection 10 mL  10 mL Intravenous PRN Mitkathi, Joelleynat S, DO        sodium chloride 0.9 % injection 2 mL  2 mL Intracatheter 2 times per day Mithani, Qaynat S, DO   2 mL at 07/06/25 0750    ondansetron (ZOFRAN) injection 4 mg  4 mg Intravenous Q8H PRN MitAndres armentaat S, DO   4 mg at 07/06/25 0848    pantoprazole (PROTONIX INJECT) injection 40 mg  40 mg Intravenous Daily Alison Nieto MD   40 mg at 07/06/25 0749    sodium chloride 0.9 % injection 10 mL  10 mL Intravenous PRN David Chandler MD        sodium chloride 0.9 % injection 2 mL  2 mL Intracatheter 2 times per day David Chandler MD   2 mL at 07/06/25 0750         Vitals  Vitals with min/max:    Vital Last Value 24 Hour Range   Temperature 97.7 °F (36.5 °C) (07/06/25 0719) Temp  Min: 97.6 °F (36.4 °C)  Max: 98.4 °F (36.9 °C)   Pulse 70 (07/06/25 0719) Pulse  Min: 70  Max: 83   Respiratory 18 (07/06/25 0719) Resp  Min: 14  Max: 19   Non-Invasive  Blood Pressure 119/76 (07/06/25 0719) BP  Min: 100/64  Max: 119/76   Pulse Oximetry 94 % (07/06/25 0719) SpO2  Min: 94 %  Max: 98 %   Arterial   Blood Pressure   No data recorded        Physical Exam      General:  Well developed well nourished in no distress  Skin:  Warm and dry   Oropharynx:  Clear  Lungs:  Clear  Heart:  Regular  Abdomen:  Soft and nontender.  Nondistended  Extremities:  Without edema    Labs   Recent Labs   Lab 07/06/25  0555 07/05/25  1847 07/05/25  0529 07/04/25  0651 07/04/25  0610 07/03/25  0714 07/03/25  0550 07/02/25  0533   WBC 11.4*  --  10.3  --  11.6*  --  13.6* 17.5*   HGB 8.5* 9.0* 6.8* 7.0* 6.8*   < > 6.7* 7.5*   HCT 27.2* 29.3* 21.8* 22.4* 21.5*   < > 21.6* 24.8*   MCV 76.0*  --  73.6*  --  72.9*  --  73.2* 76.1*   *  --  647*  --  605*  --  515* 530*   Absolute Neutrophils 8.7*  --  7.6  --  9.2*  --  11.2* 14.6*    < > = values in  this interval not displayed.       Recent Labs   Lab 07/06/25  0555 07/05/25  0529 07/04/25  0610   SODIUM 139 141 139   CHLORIDE 105 104 104   CO2 27 29 24   BUN <5* <5* <5*   CREATININE 0.57 0.60 0.55   CALCIUM 8.5 8.4 8.5   ALBUMIN 1.9* 1.7* 1.7*   BILIRUBIN 0.7 0.3 0.5   ALKPT 93 96 106   GPT 14 13 10   AST 22 17 19   GLUCOSE 108* 109* 103*        No results found for: \"LDH\"    Lab Results   Component Value Date    PT 12.8 (H) 07/06/2025       Imaging    CT CHEST W CONTRAST   Final Result   No evidence of metastatic disease in the chest. Trace bilateral effusions   and atelectasis at the bases. No infiltrates or lung masses.            Electronically Signed by: RADHA HAIDER MD    Signed on: 7/4/2025 7:22 AM    Workstation ID: [id number]      MRI PELVIS W WO CONTRAST   Final Result   1. The right psoas abscess has significantly improved but not yet   completely resolved following percutaneous drainage. There is prominent   phlegmon in the right psoas muscle extending to the level of the iliopsoas   bursa.   2. The 7.5 x 8.7 x 10.8 cm right adnexal mass is suspicious for malignancy.   I suspect ovarian or appendiceal etiology.   3. Mesenteric adenopathy in the right lower quadrant is suspicious for   regional lymph node metastasis.   4. Biopsy advised         Electronically Signed by: RICHARD BROWN MD    Signed on: 7/1/2025 2:51 PM    Workstation ID: [id number]      IR PERITONEAL OR RETROPERITONEAL MASS BIOPSY   Final Result   1.   Successful image guided percutaneous core needle biopsy of the midline   pelvic mass.   2.   Successful image guided percutaneous 12 [place] drain placement into   the right lower quadrant psoas abscess.      PLAN: Follow-up pathology and abscess fluid culture results. A 2-week   outpatient drain check has been scheduled in the event the patient is   discharged with the drain catheter in place.   ___________________________________________________________________________    ______________________________________      PROCEDURES PERFORMED:   Image guided needle placement into the pelvic mass   Core needle biopsy of the pelvic mass   Image guided needle placement into the psoas abscess   Percutaneous drain placement   Conscious Sedation      ANESTHESIA/SEDATION: Moderate conscious sedation was administered with   continuous vital signs, pulse oximetry, and ETCO2 monitoring by a   registered nurse who was present in the procedure room.  The sedation   process was conducted under the direct supervision of the physician   performing the procedure.       Sedation start at: 1528       Sedation end at: 1548      PROPHYLACTIC ANTIBIOTIC: Patient already receiving appropriate coverage.      PREPARATION: The site was prepared and draped and all elements of maximal   sterile barrier technique including sterile gloves, sterile gown, mask,   large sterile sheet, hand hygiene and cutaneous antisepsis with 2%   chlorhexidine +70% isopropyl alcohol were used. The benefits, potential   risk, and alternatives of the procedure were explained to the patient,   including but not limited to bleeding, infection and even failure of   intended outcome.  In addition, the patient was informed of the right to   refused the procedure.  Informed consent was signed and documented in the   medical record.      PROCEDURE:   IMAGE GUIDED ACCESS: The skin overlying the lower midline pelvis was   sterilely prepped and draped.  Local anesthesia was administered. A coaxial   needle was advanced into the mass under ultrasound-guidance.        Access Technique: 17-gauge needle        Access location: Midline approach, pelvis      CORE NEEDLE BIOPSY: Through the coaxial needle, core biopsies of the pelvic   mass were obtained under sonographic guidance.        Biopsy device: [employer]        Number of samples: 5      IMAGE GUIDED ACCESS of the right lower quadrant abscess: The skin overlying   the psoas fluid collection was  sterilely prepped and draped.  Local   anesthesia was administered.  The targeted collection was then accessed   with a needle using direct sonographic guidance.        Access Technique: 5 Bengali centesis catheter         Access location: Right lower quadrant, anterior approach      PERCUTANEOUS DRAIN PLACEMENT: A guidewire was advanced and coiled within   the collection before dilating the tract using Seldinger technique.  A   drain was then advanced over the guidewire, formed in the collection, and   secured in place with silk suture and a stay fix device. The catheter was   connected to accordion drainage.  A sterile dressing was applied        Catheter: 12 Bengali APD        Samples sent to the lab: Yes        Additional description of procedure: None      FINDINGS:   Sonographic evaluation of the lower midline pelvis demonstrates an   echogenic structure suspicious for soft tissue mass, with internal color   Doppler flow.      Images from the procedure revealed a collection that contains internal   debris.   The fluid appeared purulent.  Approximately 50 cc was drained at   the time of the procedure.      COMPLICATIONS: None      SPECIMENS REMOVED: Right lower quadrant abscess fluid culture, pelvic mass   core biopsy      ESTIMATED BLOOD LOSS: Minimal         Electronically Signed by: JEMMA LUGO MD    Signed on: 6/30/2025 4:20 PM    Workstation ID: [id number]      IR PERITONEAL DRAINAGE CATHETER PLACEMENT   Final Result   1.   Successful image guided percutaneous core needle biopsy of the midline   pelvic mass.   2.   Successful image guided percutaneous 12 Bengali drain placement into   the right lower quadrant psoas abscess.      PLAN: Follow-up pathology and abscess fluid culture results. A 2-week   outpatient drain check has been scheduled in the event the patient is   discharged with the drain catheter in place.   ___________________________________________________________________________    ______________________________________      PROCEDURES PERFORMED:   Image guided needle placement into the pelvic mass   Core needle biopsy of the pelvic mass   Image guided needle placement into the psoas abscess   Percutaneous drain placement   Conscious Sedation      ANESTHESIA/SEDATION: Moderate conscious sedation was administered with   continuous vital signs, pulse oximetry, and ETCO2 monitoring by a   registered nurse who was present in the procedure room.  The sedation   process was conducted under the direct supervision of the physician   performing the procedure.       Sedation start at: 1528       Sedation end at: 1548      PROPHYLACTIC ANTIBIOTIC: Patient already receiving appropriate coverage.      PREPARATION: The site was prepared and draped and all elements of maximal   sterile barrier technique including sterile gloves, sterile gown, mask,   large sterile sheet, hand hygiene and cutaneous antisepsis with 2%   chlorhexidine +70% isopropyl alcohol were used. The benefits, potential   risk, and alternatives of the procedure were explained to the patient,   including but not limited to bleeding, infection and even failure of   intended outcome.  In addition, the patient was informed of the right to   refused the procedure.  Informed consent was signed and documented in the   medical record.      PROCEDURE:   IMAGE GUIDED ACCESS: The skin overlying the lower midline pelvis was   sterilely prepped and draped.  Local anesthesia was administered. A coaxial   needle was advanced into the mass under ultrasound-guidance.        Access Technique: 17-gauge needle        Access location: Midline approach, pelvis      CORE NEEDLE BIOPSY: Through the coaxial needle, core biopsies of the pelvic   mass were obtained under sonographic guidance.        Biopsy device: [email or web address]        Number of samples: 5      IMAGE GUIDED ACCESS of the right lower quadrant abscess: The skin overlying   the psoas fluid collection was  sterilely prepped and draped.  Local   anesthesia was administered.  The targeted collection was then accessed   with a needle using direct sonographic guidance.        Access Technique: 5 Slovak centesis catheter         Access location: Right lower quadrant, anterior approach      PERCUTANEOUS DRAIN PLACEMENT: A guidewire was advanced and coiled within   the collection before dilating the tract using Seldinger technique.  A   drain was then advanced over the guidewire, formed in the collection, and   secured in place with silk suture and a stay fix device. The catheter was   connected to accordion drainage.  A sterile dressing was applied        Catheter: 12 Slovak APD        Samples sent to the lab: Yes        Additional description of procedure: None      FINDINGS:   Sonographic evaluation of the lower midline pelvis demonstrates an   echogenic structure suspicious for soft tissue mass, with internal color   Doppler flow.      Images from the procedure revealed a collection that contains internal   debris.   The fluid appeared purulent.  Approximately 50 cc was drained at   the time of the procedure.      COMPLICATIONS: None      SPECIMENS REMOVED: Right lower quadrant abscess fluid culture, pelvic mass   core biopsy      ESTIMATED BLOOD LOSS: Minimal         Electronically Signed by: JEMMA LUGO MD    Signed on: 6/30/2025 4:20 PM    Workstation ID: [id number]      CT ABDOMEN PELVIS W CONTRAST - IV contrast only   Final Result   1.   Irregular lobulated enhancing mass in the right lower quadrant   measuring approximately 12.5 x 9.7 x 9.8 cm, with extension to the adjacent   psoas muscle as well as inferior extension into the right femoral canal.   Multiple enlarged right ileocecal and mesenteric lymph nodes. This finding   is favored represent a large multiloculated abscess, possibly from ruptured   appendicitis, however the possibility of a malignancy is not excluded.   Surgical consultation is  recommended.   2.   Diffuse hypoattenuation of the liver parenchyma, suggestive of   underlying hepatic steatosis.   3.   A 0.7 cm pulmonary nodule in the posterior right lung base. Attention   on short-term follow-up in 6 to 12 months would be recommended.      The above findings were discussed with nurse liu  by Dr. Briones via   phone on 6/29/2025 10:36 PM.               Electronically Signed by: CELENA BRIONES MD    Signed on: 6/29/2025 10:44 PM    Workstation ID: [id number]             Impression  1.  Right lower quadrant mass of uncertain etiology.  An abscess drain was placed June 30, 2025 with return of purulent fluid.  A core biopsy was sent at that time as well.  MRI of the pelvis reveals a 10 cm right adnexal mass suspicious for malignancy either of ovarian or appendiceal etiology.  She is on empiric antibiotics per infectious disease with improved temperature curve.    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        Plan  Continue abscess drainage per interventional radiology  Continue empiric antibiotics per infectious disease  S/p pelvic mass biopsy 6/30/25 with IR, consistent with adenocarcinoma  Path is c/w colorectal in origin  Will get CT chest to stage disease  Apparently, she is scheduled for potential surgical resection by surgical colleagues on Monday  CT of the chest shows no obvious metastatic disease, patient will likely go for surgery in a few days    Meanwhile, we will address anemia, hemoglobin up to 8.5 after iron infusions, clear-cut iron deficiency, secondary to blood loss from colon tumor    IV iron will be provided through the weekend    Tomorrow, surgery    Hematology/Oncology     7/6/2025     no

## 2025-07-07 ENCOUNTER — APPOINTMENT (OUTPATIENT)
Dept: ALLERGY | Facility: CLINIC | Age: 75
End: 2025-07-07
Payer: COMMERCIAL

## 2025-07-07 VITALS
TEMPERATURE: 98.5 F | OXYGEN SATURATION: 98 % | DIASTOLIC BLOOD PRESSURE: 80 MMHG | SYSTOLIC BLOOD PRESSURE: 118 MMHG | HEART RATE: 85 BPM

## 2025-07-07 PROCEDURE — 94060 EVALUATION OF WHEEZING: CPT

## 2025-07-07 PROCEDURE — 95012 NITRIC OXIDE EXP GAS DETER: CPT

## 2025-07-07 PROCEDURE — 99214 OFFICE O/P EST MOD 30 MIN: CPT | Mod: 25

## 2025-07-07 RX ORDER — TRIAMCINOLONE ACETONIDE 5 MG/G
0.5 CREAM TOPICAL 3 TIMES DAILY
Qty: 80 | Refills: 0 | Status: ACTIVE | COMMUNITY
Start: 2025-07-07 | End: 1900-01-01

## 2025-07-10 ENCOUNTER — APPOINTMENT (OUTPATIENT)
Dept: NUCLEAR MEDICINE | Facility: CLINIC | Age: 75
End: 2025-07-10

## 2025-07-10 ENCOUNTER — OUTPATIENT (OUTPATIENT)
Dept: OUTPATIENT SERVICES | Facility: HOSPITAL | Age: 75
LOS: 1 days | End: 2025-07-10
Payer: COMMERCIAL

## 2025-07-10 DIAGNOSIS — C82.90 FOLLICULAR LYMPHOMA, UNSPECIFIED, UNSPECIFIED SITE: ICD-10-CM

## 2025-07-10 PROCEDURE — 78815 PET IMAGE W/CT SKULL-THIGH: CPT | Mod: 26,PS

## 2025-07-10 PROCEDURE — 78815 PET IMAGE W/CT SKULL-THIGH: CPT

## 2025-07-10 PROCEDURE — A9552: CPT

## 2025-07-25 ENCOUNTER — NON-APPOINTMENT (OUTPATIENT)
Age: 75
End: 2025-07-25

## 2025-07-30 DIAGNOSIS — Z87.898 PERSONAL HISTORY OF OTHER SPECIFIED CONDITIONS: ICD-10-CM

## 2025-07-30 DIAGNOSIS — Z86.018 PERSONAL HISTORY OF OTHER BENIGN NEOPLASM: ICD-10-CM

## 2025-07-30 DIAGNOSIS — Z87.2 PERSONAL HISTORY OF DISEASES OF THE SKIN AND SUBCUTANEOUS TISSUE: ICD-10-CM

## 2025-07-30 DIAGNOSIS — Z87.19 PERSONAL HISTORY OF OTHER DISEASES OF THE DIGESTIVE SYSTEM: ICD-10-CM
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
## 2025-07-30 DIAGNOSIS — Z01.818 ENCOUNTER FOR OTHER PREPROCEDURAL EXAMINATION: ICD-10-CM

## 2025-07-30 DIAGNOSIS — M19.90 UNSPECIFIED OSTEOARTHRITIS, UNSPECIFIED SITE: ICD-10-CM

## 2025-08-01 ENCOUNTER — APPOINTMENT (OUTPATIENT)
Dept: INFUSION THERAPY | Facility: CLINIC | Age: 75
End: 2025-08-01
Payer: COMMERCIAL

## 2025-08-01 ENCOUNTER — RESULT REVIEW (OUTPATIENT)
Age: 75
End: 2025-08-01

## 2025-08-01 ENCOUNTER — APPOINTMENT (OUTPATIENT)
Dept: HEMATOLOGY ONCOLOGY | Facility: CLINIC | Age: 75
End: 2025-08-01
Payer: COMMERCIAL

## 2025-08-01 VITALS
HEART RATE: 79 BPM | BODY MASS INDEX: 28.62 KG/M2 | DIASTOLIC BLOOD PRESSURE: 74 MMHG | SYSTOLIC BLOOD PRESSURE: 109 MMHG | WEIGHT: 180 LBS | TEMPERATURE: 97.9 F | OXYGEN SATURATION: 96 %

## 2025-08-01 DIAGNOSIS — I26.99 OTHER PULMONARY EMBOLISM W/OUT ACUTE COR PULMONALE: ICD-10-CM

## 2025-08-01 DIAGNOSIS — C82.90 FOLLICULAR LYMPHOMA, UNSPECIFIED, UNSPECIFIED SITE: ICD-10-CM

## 2025-08-01 LAB
ANISOCYTOSIS BLD QL: SLIGHT — SIGNIFICANT CHANGE UP
BASOPHILS # BLD AUTO: 0.04 K/UL — SIGNIFICANT CHANGE UP (ref 0–0.2)
BASOPHILS # BLD MANUAL: 0.04 K/UL — SIGNIFICANT CHANGE UP (ref 0–0.2)
BASOPHILS NFR BLD AUTO: 1 % — SIGNIFICANT CHANGE UP (ref 0–2)
BASOPHILS NFR BLD MANUAL: 1 % — SIGNIFICANT CHANGE UP (ref 0–2)
ELLIPTOCYTES BLD QL SMEAR: SLIGHT — SIGNIFICANT CHANGE UP
EOSINOPHIL # BLD AUTO: 0.08 K/UL — SIGNIFICANT CHANGE UP (ref 0–0.5)
EOSINOPHIL # BLD MANUAL: 0.04 K/UL — SIGNIFICANT CHANGE UP (ref 0–0.5)
EOSINOPHIL NFR BLD AUTO: 2 % — SIGNIFICANT CHANGE UP (ref 0–6)
EOSINOPHIL NFR BLD MANUAL: 1 % — SIGNIFICANT CHANGE UP (ref 0–6)
HCT VFR BLD CALC: 36 % — SIGNIFICANT CHANGE UP (ref 34.5–45)
HGB BLD-MCNC: 12.2 G/DL — SIGNIFICANT CHANGE UP (ref 11.5–15.5)
IMM GRANULOCYTES # BLD AUTO: 0.01 K/UL — SIGNIFICANT CHANGE UP (ref 0–0.07)
IMM GRANULOCYTES NFR BLD AUTO: 0.2 % — SIGNIFICANT CHANGE UP (ref 0–0.9)
LYMPHOCYTES # BLD AUTO: 0.2 K/UL — LOW (ref 1–3.3)
LYMPHOCYTES # BLD MANUAL: 0.37 K/UL — LOW (ref 1–3.3)
LYMPHOCYTES NFR BLD AUTO: 4.9 % — LOW (ref 13–44)
LYMPHOCYTES NFR BLD MANUAL: 9 % — LOW (ref 13–44)
MACROCYTES BLD QL: SLIGHT — SIGNIFICANT CHANGE UP
MCHC RBC-ENTMCNC: 31.8 PG — SIGNIFICANT CHANGE UP (ref 27–34)
MCHC RBC-ENTMCNC: 33.9 G/DL — SIGNIFICANT CHANGE UP (ref 32–36)
MCV RBC AUTO: 93.8 FL — SIGNIFICANT CHANGE UP (ref 80–100)
MONOCYTES # BLD AUTO: 0.5 K/UL — SIGNIFICANT CHANGE UP (ref 0–0.9)
MONOCYTES # BLD MANUAL: 0.37 K/UL — SIGNIFICANT CHANGE UP (ref 0–0.9)
MONOCYTES NFR BLD AUTO: 12.3 % — SIGNIFICANT CHANGE UP (ref 2–14)
MONOCYTES NFR BLD MANUAL: 9 % — SIGNIFICANT CHANGE UP (ref 2–14)
NEUTROPHILS # BLD AUTO: 3.23 K/UL — SIGNIFICANT CHANGE UP (ref 1.8–7.4)
NEUTROPHILS # BLD MANUAL: 3.25 K/UL — SIGNIFICANT CHANGE UP (ref 1.8–7.4)
NEUTROPHILS NFR BLD AUTO: 79.6 % — HIGH (ref 43–77)
NEUTROPHILS NFR BLD MANUAL: 80 % — HIGH (ref 43–77)
NRBC # BLD AUTO: 0 K/UL — SIGNIFICANT CHANGE UP (ref 0–0)
NRBC # FLD: 0 K/UL — SIGNIFICANT CHANGE UP (ref 0–0)
NRBC BLD AUTO-RTO: 0 /100 WBCS — SIGNIFICANT CHANGE UP (ref 0–0)
PLAT MORPH BLD: NORMAL — SIGNIFICANT CHANGE UP
PLATELET # BLD AUTO: 120 K/UL — LOW (ref 150–400)
PMV BLD: 11.6 FL — SIGNIFICANT CHANGE UP (ref 7–13)
RBC # BLD: 3.84 M/UL — SIGNIFICANT CHANGE UP (ref 3.8–5.2)
RBC # FLD: 13 % — SIGNIFICANT CHANGE UP (ref 10.3–14.5)
RBC BLD AUTO: SIGNIFICANT CHANGE UP
WBC # BLD: 4.06 K/UL — SIGNIFICANT CHANGE UP (ref 3.8–10.5)
WBC # FLD AUTO: 4.06 K/UL — SIGNIFICANT CHANGE UP (ref 3.8–10.5)

## 2025-08-01 PROCEDURE — 99215 OFFICE O/P EST HI 40 MIN: CPT

## 2025-08-08 ENCOUNTER — NON-APPOINTMENT (OUTPATIENT)
Age: 75
End: 2025-08-08

## 2025-08-11 ENCOUNTER — NON-APPOINTMENT (OUTPATIENT)
Age: 75
End: 2025-08-11